# Patient Record
Sex: MALE | Race: WHITE | Employment: OTHER | URBAN - METROPOLITAN AREA
[De-identification: names, ages, dates, MRNs, and addresses within clinical notes are randomized per-mention and may not be internally consistent; named-entity substitution may affect disease eponyms.]

---

## 2017-11-29 ENCOUNTER — OFFICE VISIT (OUTPATIENT)
Dept: CARDIOLOGY CLINIC | Age: 72
End: 2017-11-29

## 2017-11-29 VITALS
HEIGHT: 70 IN | SYSTOLIC BLOOD PRESSURE: 120 MMHG | DIASTOLIC BLOOD PRESSURE: 58 MMHG | WEIGHT: 204 LBS | BODY MASS INDEX: 29.2 KG/M2 | HEART RATE: 62 BPM

## 2017-11-29 DIAGNOSIS — I10 ESSENTIAL HYPERTENSION: Primary | ICD-10-CM

## 2017-11-29 DIAGNOSIS — I25.10 CORONARY ARTERY DISEASE INVOLVING NATIVE HEART WITHOUT ANGINA PECTORIS, UNSPECIFIED VESSEL OR LESION TYPE: ICD-10-CM

## 2017-11-29 DIAGNOSIS — E78.00 PURE HYPERCHOLESTEROLEMIA: ICD-10-CM

## 2017-11-29 PROCEDURE — 99214 OFFICE O/P EST MOD 30 MIN: CPT | Performed by: NURSE PRACTITIONER

## 2017-11-29 RX ORDER — SUCRALFATE 1 G/1
1 TABLET ORAL 4 TIMES DAILY
COMMUNITY
End: 2018-05-09

## 2017-11-29 RX ORDER — ATORVASTATIN CALCIUM 40 MG/1
TABLET, FILM COATED ORAL
Qty: 90 TABLET | Refills: 3 | Status: SHIPPED | OUTPATIENT
Start: 2017-11-29 | End: 2018-11-14 | Stop reason: SDUPTHER

## 2017-11-29 RX ORDER — METOPROLOL SUCCINATE 50 MG/1
TABLET, EXTENDED RELEASE ORAL
Qty: 90 TABLET | Refills: 3 | Status: SHIPPED | OUTPATIENT
Start: 2017-11-29 | End: 2018-11-14 | Stop reason: SDUPTHER

## 2017-11-29 NOTE — COMMUNICATION BODY
[facility]     Outpatient Follow Up Note    Subjective:   CHIEF COMPLAINT / HPI:  Follow Up secondary to hyperlipidemia and coronary artery disease, and HTN       William Avalos is 81 y.o. male who presents today for a routine follow up. Currently the patient denies significant chest pain. There is no associated SMITH. The patient is not experiencing palpitations or dizziness. With regard to medication therapy the patient has been compliant with prescribed regimen He has been have intermittently stomach pain for at least one year and ECHO (2014)showed gallstones. He had his GB removed then had colonoscopy and EGD which a polyp which was cancerous and then had surgery. He did not need any chemo or radiation      Cardiac angiogram 2008-hx of NonQwave MI, s/p PCI of LAD, with nonobstructive disease in RCA and cfx,  NL EF. Past Medical History:    Past Medical History:   Diagnosis Date    Atrial fibrillation (HCC)     DOES NOT HAVE HYPERTENSION, PER PT     Social History:    History   Smoking Status    Current Every Day Smoker    Packs/day: 0.50    Years: 45.00    Types: Cigarettes   Smokeless Tobacco    Never Used     Current Medications:  Current Outpatient Prescriptions on File Prior to Visit   Medication Sig Dispense Refill    calcium carbonate (TUMS) 500 MG chewable tablet Take 1 tablet by mouth daily      aspirin EC 81 MG EC tablet Take 1 tablet by mouth daily. 30 tablet 11     No current facility-administered medications on file prior to visit. REVIEW OF SYSTEMS:    CONSTITUTIONAL: No major weight gain or loss, fatigue, weakness, night sweats or fever. HEENT: No new vision difficulties or ringing in the ears. RESPIRATORY: No new SOB, PND, orthopnea or cough. CARDIOVASCULAR: See HPI  GI: No nausea, vomiting, diarrhea, constipation, abdominal pain or changes in bowel habits. : No urinary frequency, urgency, incontinence hematuria or dysuria.   SKIN: No cyanosis or skin lesions. MUSCULOSKELETAL: No new muscle or joint pain. NEUROLOGICAL: No syncope or TIA-like symptoms. PSYCHIATRIC: No anxiety, pain, insomnia or depression    Objective:   PHYSICAL EXAM:        VITALS:    Vitals:    11/29/17 1507   BP: (!) 120/58   Pulse: 62       CONSTITUTIONAL: Cooperative, no apparent distress, and appears well nourished / developed  NEUROLOGIC:  Awake and orientated to person, place and time. PSYCH: Calm affect. SKIN: Warm and dry. HEENT: Sclera non-icteric, normocephalic, neck supple, no elevation of JVP, normal carotid pulses with no bruits and thyroid normal size. LUNGS:  No increased work of breathing and clear to auscultation, no crackles or wheezing  CARDIOVASCULAR:  Regular rate and rhythm with no murmurs, gallops, rubs, or abnormal heart sounds, normal PMI. The apical impulses not displaced  JVP less than 8 cm H2O  Heart tones are crisp and normal  Cervical veins are not engorged  The carotid upstroke is normal in amplitude and contour without delay or bruit  JVP is not elevated  ABDOMEN:  Normal bowel sounds, non-distended and non-tender to palpation  EXT: No edema, no calf tenderness. Pulses are present bilaterally. Last Angiogram: 2008, hx of NonQwave MI, s/p PCI of LAD, with nonobstructive disease in RCA and cfx,  NL EF. Last ECHO: 6/2014  -Normal left ventricle size and systolic function with an estimated   ejection  fraction of 55%. -Borderline concentric left ventricular hypertrophy is present.  -No regional wall motion abnormalities are seen.  -Normal diastolic filling pattern for age. -There is mild mitral and trivial tricuspid regurgitation with RVSP  estimated at 26 mmHg. -Incidentally multiple gallstones visualized. DATA:    Assessment:     1. Hyperlipidemia   Needs drawn 8/14  Cholesterol, Total 195  Final   Triglycerides 201  HDL 36        Lipitor 40mg daily  Order Lipid panel      2.   CAD-stable no need for testing, he does not have any chest

## 2017-11-29 NOTE — PROGRESS NOTES
lesions. MUSCULOSKELETAL: No new muscle or joint pain. NEUROLOGICAL: No syncope or TIA-like symptoms. PSYCHIATRIC: No anxiety, pain, insomnia or depression    Objective:   PHYSICAL EXAM:        VITALS:    Vitals:    11/29/17 1507   BP: (!) 120/58   Pulse: 62       CONSTITUTIONAL: Cooperative, no apparent distress, and appears well nourished / developed  NEUROLOGIC:  Awake and orientated to person, place and time. PSYCH: Calm affect. SKIN: Warm and dry. HEENT: Sclera non-icteric, normocephalic, neck supple, no elevation of JVP, normal carotid pulses with no bruits and thyroid normal size. LUNGS:  No increased work of breathing and clear to auscultation, no crackles or wheezing  CARDIOVASCULAR:  Regular rate and rhythm with no murmurs, gallops, rubs, or abnormal heart sounds, normal PMI. The apical impulses not displaced  JVP less than 8 cm H2O  Heart tones are crisp and normal  Cervical veins are not engorged  The carotid upstroke is normal in amplitude and contour without delay or bruit  JVP is not elevated  ABDOMEN:  Normal bowel sounds, non-distended and non-tender to palpation  EXT: No edema, no calf tenderness. Pulses are present bilaterally. Last Angiogram: 2008, hx of NonQwave MI, s/p PCI of LAD, with nonobstructive disease in RCA and cfx,  NL EF. Last ECHO: 6/2014  -Normal left ventricle size and systolic function with an estimated   ejection  fraction of 55%. -Borderline concentric left ventricular hypertrophy is present.  -No regional wall motion abnormalities are seen.  -Normal diastolic filling pattern for age. -There is mild mitral and trivial tricuspid regurgitation with RVSP  estimated at 26 mmHg. -Incidentally multiple gallstones visualized. DATA:    Assessment:     1. Hyperlipidemia   Needs drawn 8/14  Cholesterol, Total 195  Final   Triglycerides 201  HDL 36        Lipitor 40mg daily  Order Lipid panel      2.   CAD-stable no need for testing, he does not have any chest pain/SOB  3. HTN-   Plan:   I had the opportunity to review the clinical symptoms and presentation of Lisa Mcginnis. The patient is currently smoking. The risks related to smoking were reviewed with the patient. Recommend maintaining a smoke-free lifestyle. Products available for smoking cessation were discussed in detail. Discussed exercise, patient golf's 3 times a week at [facility]. Low saturated fat diet. Thank you for allowing to us to participate in the care of Lisa Mcginnis.

## 2017-11-29 NOTE — LETTER
[address] Cardiology - [facility]  [address] [place]. [place] [address]  Phone: [phone]  Fax: [phone]    Adam Hand NP        November 29, 2017     Fiorella Bertrand MD  [address]    Patient: Elvi Blake  MR Number: [id number]  YOB: 1945  Date of Visit: 11/29/2017    Dear Dr. Fiorella Bertrand:            Lilliana Gooden     Outpatient Follow Up Note    Subjective:   [facility]:  Follow Up secondary to hyperlipidemia and coronary artery disease, and HTN       Elvi Blake is 81 y.o. male who presents today for a routine follow up. Currently the patient denies significant chest pain. There is no associated SMITH. The patient is not experiencing palpitations or dizziness. With regard to medication therapy the patient has been compliant with prescribed regimen He has been have intermittently stomach pain for at least one year and ECHO (2014)showed gallstones. He had his GB removed then had colonoscopy and EGD which a polyp which was cancerous and then had surgery. He did not need any chemo or radiation      Cardiac angiogram 2008-hx of NonQwave MI, s/p PCI of LAD, with nonobstructive disease in RCA and cfx,  NL EF. Past Medical History:    Past Medical History:   Diagnosis Date    Atrial fibrillation (HCC)     DOES NOT HAVE HYPERTENSION, PER PT     Social History:    History   Smoking Status    Current Every Day Smoker    Packs/day: 0.50    Years: 45.00    Types: Cigarettes   Smokeless Tobacco    Never Used     Current Medications:  Current Outpatient Prescriptions on File Prior to Visit   Medication Sig Dispense Refill    calcium carbonate (TUMS) 500 MG chewable tablet Take 1 tablet by mouth daily      aspirin EC 81 MG EC tablet Take 1 tablet by mouth daily. 30 tablet 11     No current facility-administered medications on file prior to visit.       REVIEW OF SYSTEMS: -Normal diastolic filling pattern for age. -There is mild mitral and trivial tricuspid regurgitation with RVSP  estimated at 26 mmHg. -Incidentally multiple gallstones visualized. DATA:    Assessment:     1. Hyperlipidemia   Needs drawn 8/14  Cholesterol, Total 195  Final   Triglycerides 201  HDL 36        Lipitor 40mg daily  Order Lipid panel      2. CAD-stable no need for testing, he does not have any chest pain/SOB  3. HTN-   Plan:   I had the opportunity to review the clinical symptoms and presentation of Meredith Miner. The patient is currently smoking. The risks related to smoking were reviewed with the patient. Recommend maintaining a smoke-free lifestyle. Products available for smoking cessation were discussed in detail. Discussed exercise, patient golf's 3 times a week at [employer]. Low saturated fat diet. Thank you for allowing to us to participate in the care of Meredith Miner. If you have questions, please do not hesitate to call me. I look forward to following Jose Ochoa along with you.     Sincerely,        Soraida Osorio NP

## 2018-05-09 ENCOUNTER — OFFICE VISIT (OUTPATIENT)
Dept: CARDIOLOGY CLINIC | Age: 73
End: 2018-05-09

## 2018-05-09 VITALS
WEIGHT: 209 LBS | HEART RATE: 66 BPM | SYSTOLIC BLOOD PRESSURE: 134 MMHG | HEIGHT: 70 IN | BODY MASS INDEX: 29.92 KG/M2 | DIASTOLIC BLOOD PRESSURE: 70 MMHG

## 2018-05-09 DIAGNOSIS — I25.10 CORONARY ARTERY DISEASE INVOLVING NATIVE HEART WITHOUT ANGINA PECTORIS, UNSPECIFIED VESSEL OR LESION TYPE: ICD-10-CM

## 2018-05-09 DIAGNOSIS — I10 ESSENTIAL HYPERTENSION: ICD-10-CM

## 2018-05-09 DIAGNOSIS — E78.00 PURE HYPERCHOLESTEROLEMIA: Primary | ICD-10-CM

## 2018-05-09 PROCEDURE — 99214 OFFICE O/P EST MOD 30 MIN: CPT | Performed by: NURSE PRACTITIONER

## 2018-05-09 PROCEDURE — 93000 ELECTROCARDIOGRAM COMPLETE: CPT | Performed by: NURSE PRACTITIONER

## 2018-05-17 ENCOUNTER — PROCEDURE VISIT (OUTPATIENT)
Dept: CARDIOLOGY CLINIC | Age: 73
End: 2018-05-17

## 2018-05-17 DIAGNOSIS — I25.10 CORONARY ARTERY DISEASE INVOLVING NATIVE HEART WITHOUT ANGINA PECTORIS, UNSPECIFIED VESSEL OR LESION TYPE: ICD-10-CM

## 2018-05-17 DIAGNOSIS — I10 ESSENTIAL HYPERTENSION: ICD-10-CM

## 2018-05-17 DIAGNOSIS — R00.2 PALPITATIONS: Primary | ICD-10-CM

## 2018-05-17 PROCEDURE — 93325 DOPPLER ECHO COLOR FLOW MAPG: CPT | Performed by: INTERNAL MEDICINE

## 2018-05-17 PROCEDURE — 93320 DOPPLER ECHO COMPLETE: CPT | Performed by: INTERNAL MEDICINE

## 2018-05-17 PROCEDURE — 93351 STRESS TTE COMPLETE: CPT | Performed by: INTERNAL MEDICINE

## 2018-06-27 ENCOUNTER — TELEPHONE (OUTPATIENT)
Dept: CARDIOLOGY CLINIC | Age: 73
End: 2018-06-27

## 2018-08-01 PROBLEM — R00.2 HEART PALPITATIONS: Status: ACTIVE | Noted: 2018-08-01

## 2018-08-02 ENCOUNTER — OFFICE VISIT (OUTPATIENT)
Dept: CARDIOLOGY CLINIC | Age: 73
End: 2018-08-02

## 2018-08-02 VITALS
WEIGHT: 205.8 LBS | DIASTOLIC BLOOD PRESSURE: 81 MMHG | SYSTOLIC BLOOD PRESSURE: 110 MMHG | HEART RATE: 90 BPM | BODY MASS INDEX: 29.46 KG/M2 | HEIGHT: 70 IN

## 2018-08-02 DIAGNOSIS — R00.2 HEART PALPITATIONS: ICD-10-CM

## 2018-08-02 DIAGNOSIS — I25.10 CORONARY ARTERY DISEASE INVOLVING NATIVE CORONARY ARTERY OF NATIVE HEART WITHOUT ANGINA PECTORIS: ICD-10-CM

## 2018-08-02 DIAGNOSIS — I48.0 PAF (PAROXYSMAL ATRIAL FIBRILLATION) (HCC): Primary | ICD-10-CM

## 2018-08-02 DIAGNOSIS — I10 BENIGN ESSENTIAL HTN: ICD-10-CM

## 2018-08-02 DIAGNOSIS — E78.5 HYPERLIPIDEMIA, UNSPECIFIED HYPERLIPIDEMIA TYPE: ICD-10-CM

## 2018-08-02 PROCEDURE — 93000 ELECTROCARDIOGRAM COMPLETE: CPT | Performed by: INTERNAL MEDICINE

## 2018-08-02 PROCEDURE — 99244 OFF/OP CNSLTJ NEW/EST MOD 40: CPT | Performed by: INTERNAL MEDICINE

## 2018-08-02 NOTE — LETTER
Susan Pichardo  EP Procedure Sheet      Darlin Lin  1945    EP Procedures     Pacemaker implant  EP Study    ICD implant  Atrial flutter ablation     Biv implant  Atrial fibrillation ablation    Generator Change  SVT ablation    Lead revision  VT ablation    Lead extraction +/- upgrade  AVN ablation    Loop implant X Cardioversion     Other:   CAPRI     Equipment     Medtronic   CHENG Mapping System    [place]  [address] [address]  CryoAblation    Biotronik  Laser Lead Extraction    Special Equipment       EP Procedures Scheduling Request    Time Requested     Specific Day    Anesthesia    CT surgery backup    Location      Pre-Procedure Labs / Imaging     PT/INR  Type & cross    CBC  Units PRBC    BMP/Mg  Units FFP    Venogram  CXR    Echo  Cardiac CTA for Pulmonary vein mapping     Patient Instructions    Continue eliquis  Remain NPO after midnight tonight  Cardioversion tomorrow  Call tomorrow at 11 am if would like to cancel cardioversion ( if comes out of atrial  fibrillation)

## 2018-08-02 NOTE — PROGRESS NOTES
[facility]   Electrophysiology consultation  Date: 8/2/2018  I had the privilege of visiting Merced Jose in the office on referral from Dr. José Argueta    CC:   afib    HPI    Merced Jose is 72 y.o. male who presents today for a follow up since diagnosed with AF and starting eliquis.                Cardiac angiogram 2008-hx of NonQwave MI, s/p PCI of LAD, with nonobstructive disease in RCA and cfx,  NL EF. He had an episode of Afib in 2001. He has also been treated for rectal cancer. Yesterday at 4 pm, 6 holes into golfing, he developed a headache and overall not feeling well. He still feels poorly now, is in afib. Event monitor showed afib(13 percent) aflutter, stress echo nl. He was started on eliquis 6/27/18. He has not had any chest pain, sob. He has had palpitations for several years and just getting worse            Past Medical History:   Diagnosis Date    Atrial fibrillation ([address]     DOES NOT HAVE HYPERTENSION, PER PT        Past Surgical History:   Procedure Laterality Date    CHOLECYSTECTOMY, LAPAROSCOPIC  1/13/16    LAPAROSCOPIC CHOLECYSTECTOMY WITH CHOLANGIOGRAM    COLECTOMY  [facility]/08/2017    [facility]    CORONARY ANGIOPLASTY WITH STENT PLACEMENT  2008    TONSILLECTOMY  1955    TOOTH EXTRACTION  2013    wisdom       Allergies:  No Known Allergies    Social History:  Reviewed. reports that he has been smoking Cigarettes. He has a 22.50 pack-year smoking history. He has never used smokeless tobacco. He reports that he drinks about 6.0 oz of alcohol per week . He reports that he does not use drugs. Family History:  Reviewed. family history includes Heart Disease in his mother. Review of System:  All other systems reviewed except for that noted above.  Pertinent negatives and positives are:     · General: negative for fever, chills   · Ophthalmic ROS: negative for - eye pain or loss of vision  · ENT ROS: negative for - headaches, sore throat · Respiratory: negative for - cough, sputum  · Cardiovascular: Reviewed in HPI  · Gastrointestinal: negative for - abdominal pain, diarrhea, N/V  · Hematology: negative for - bleeding, blood clots, bruising or jaundice  · Genito-Urinary:  negative for - Dysuria or incontinence  · Musculoskeletal: negative for - Joint swelling, muscle pain  · Neurological: negative for - confusion, dizziness, headaches   · Psychiatric: No anxiety, no depression. · Dermatological: negative for - rash    Physical Examination:  Vitals:    08/02/18 1417   BP: 110/81   Pulse: 90        · Constitutional: Oriented. No distress. · Head: Normocephalic and atraumatic. · Mouth/Throat: Oropharynx is clear and moist.   · Eyes: Conjunctivae normal. EOM are normal.   · Neck: Neck supple. No rigidity. No JVD present. · Cardiovascular: Normal rate, irregular rhythm, S1&S2. · Pulmonary/Chest: Bilateral respiratory sounds. No wheezes, No rhonchi. · Abdominal: Soft. Bowel sounds present. No distension, No tenderness. · Musculoskeletal: No tenderness. No edema    · Lymphadenopathy: Has no cervical adenopathy. · Neurological: Alert and oriented. Cranial nerve appears intact, No Gross deficit   · Skin: Skin is warm and dry. No rash noted. · Psychiatric: Has a normal behavior     Labs, diagnostic and imaging results reviewed.      ECG: atrial fibrillation 81  Echo: 5/18 normal stress echo  Cath: 2008-hx of NonQwave MI, s/p PCI of LAD, with nonobstructive disease in RCA and cfx,  NL EF    Medication:  Current Outpatient Prescriptions   Medication Sig Dispense Refill    apixaban (ELIQUIS) 5 MG TABS tablet Take 1 tablet by mouth 2 times daily 60 tablet 5    esomeprazole (NEXIUM) 20 MG delayed release capsule Take 20 mg by mouth every morning (before breakfast)      RaNITidine HCl (ZANTAC 150 MAXIMUM STRENGTH PO) Take by mouth      metoprolol succinate (TOPROL XL) 50 MG extended release tablet TAKE ONE TABLET BY MOUTH DAILY 90 tablet 3  atorvastatin (LIPITOR) 40 MG tablet TAKE 1 TABLET BY MOUTH DAILY 90 tablet 3    calcium carbonate (TUMS) 500 MG chewable tablet Take 1 tablet by mouth daily      aspirin EC 81 MG EC tablet Take 1 tablet by mouth daily. 30 tablet 11     No current facility-administered medications for this visit. Assessment and plan:   CAD--2008-hx of NonQwave MI, s/p PCI of LAD, with nonobstructive disease in RCA and cfx,  NL EF  5/18 stress echo nl    Atrial fibrillation -symptomatic, on eliquis since 6/27/18 6/18 Creat 1.1  Discussed ablation, cardioversion, medical management with antiarrhythmic drugs , rate vs rhythm control  Given his symptoms a rhythm startegy is better suited  Afib risk factors including age, HTN, obesity, inactivity and MEIR were discussed with patient. Risk factor modification recommended. All questions were answered. For the time being, will do cardioversion if he does not go back to sinus  May consider tikosyn or amiodarone vs ablation      htn--/81 (Site: Left Arm, Position: Sitting, Cuff Size: Large Adult)   Pulse 90   Ht 5' 10\" (1.778 m)   Wt 205 lb 12.8 oz (93.4 kg)   BMI 29.53 kg/m²       Hchol--11/16/15  tri 241 ldl 94 hdl 37   On lipitor    Plan  Continue eliquis  Cardioversion tomorrow if still in afib  Recommended to remain npo, but take eliquis  Will consider ablation, information given  Instructed to call if has decided on medical management vs ablation  Follow up in 3 months       Thank you for allowing me to participate in the care of Fawad Parada. Further evaluation will be based upon the patient's clinical course and testing results. All questions and concerns were addressed to the patient/family. Alternatives to my treatment were discussed. I have discussed the above stated plan and the patient verbalized understanding and agreed with the plan. NOTE: This report was transcribed using voice recognition software.  Every effort was made to

## 2018-08-08 ENCOUNTER — TELEPHONE (OUTPATIENT)
Dept: CARDIOLOGY CLINIC | Age: 73
End: 2018-08-08

## 2018-08-21 ENCOUNTER — TELEPHONE (OUTPATIENT)
Dept: CARDIOLOGY CLINIC | Age: 73
End: 2018-08-21

## 2018-08-21 NOTE — TELEPHONE ENCOUNTER
RN spoke with Myriam Elena and advised her that he would likely benefit more from having the colonoscopy first as he will not be able to hold anticoagulation post ablation. Myriam Elena will be faxing a form of risk assessment.

## 2018-08-21 NOTE — TELEPHONE ENCOUNTER
Doctors office calling to see if [facility] wants this patient to wait until after his ablation to have his colonoscopy ?  Patient has not scheduled ablation yet because he wasn't sure which procedure he should have done first .

## 2018-09-11 ENCOUNTER — TELEPHONE (OUTPATIENT)
Dept: CARDIOLOGY CLINIC | Age: 73
End: 2018-09-11

## 2018-09-11 NOTE — TELEPHONE ENCOUNTER
Anette Aguirre, states [facility] wants a letter sent to Dr Diane Juarez stating Eliquis can be stopped after Sat 09/15/18 dose for his colonoscopy on 09/17/18.  Pls call to advise Thank you

## 2018-10-18 ENCOUNTER — ANESTHESIA EVENT (OUTPATIENT)
Dept: CARDIAC CATH/INVASIVE PROCEDURES | Age: 73
End: 2018-10-18
Payer: MEDICARE

## 2018-10-19 ENCOUNTER — ANESTHESIA (OUTPATIENT)
Dept: CARDIAC CATH/INVASIVE PROCEDURES | Age: 73
End: 2018-10-19
Payer: MEDICARE

## 2018-10-19 ENCOUNTER — HOSPITAL ENCOUNTER (OUTPATIENT)
Dept: CARDIAC CATH/INVASIVE PROCEDURES | Age: 73
Setting detail: OBSERVATION
Discharge: HOME | End: 2018-10-20
Attending: INTERNAL MEDICINE | Admitting: INTERNAL MEDICINE
Payer: MEDICARE

## 2018-10-19 VITALS
SYSTOLIC BLOOD PRESSURE: 124 MMHG | TEMPERATURE: 98.1 F | DIASTOLIC BLOOD PRESSURE: 68 MMHG | RESPIRATION RATE: 3 BRPM | OXYGEN SATURATION: 99 %

## 2018-10-19 PROBLEM — I48.0 PAF (PAROXYSMAL ATRIAL FIBRILLATION) (HCC): Status: ACTIVE | Noted: 2018-10-19

## 2018-10-19 LAB
A/G RATIO: 1.4 (ref 1.1–2.2)
ABO/RH: NORMAL
ALBUMIN SERPL-MCNC: 4.2 G/DL (ref 3.4–5)
ALP BLD-CCNC: 91 U/L (ref 40–129)
ALT SERPL-CCNC: 16 U/L (ref 10–40)
ANION GAP SERPL CALCULATED.3IONS-SCNC: 9 MMOL/L (ref 3–16)
ANTIBODY SCREEN: NORMAL
AST SERPL-CCNC: 14 U/L (ref 15–37)
BILIRUB SERPL-MCNC: 0.4 MG/DL (ref 0–1)
BUN BLDV-MCNC: 19 MG/DL (ref 7–20)
CALCIUM SERPL-MCNC: 10.2 MG/DL (ref 8.3–10.6)
CHLORIDE BLD-SCNC: 104 MMOL/L (ref 99–110)
CO2: 25 MMOL/L (ref 21–32)
CREAT SERPL-MCNC: 1 MG/DL (ref 0.8–1.3)
EKG ATRIAL RATE: 51 BPM
EKG DIAGNOSIS: NORMAL
EKG P AXIS: 38 DEGREES
EKG P-R INTERVAL: 172 MS
EKG Q-T INTERVAL: 412 MS
EKG QRS DURATION: 84 MS
EKG QTC CALCULATION (BAZETT): 379 MS
EKG R AXIS: 17 DEGREES
EKG T AXIS: 42 DEGREES
EKG VENTRICULAR RATE: 51 BPM
GFR AFRICAN AMERICAN: >60
GFR NON-AFRICAN AMERICAN: >60
GLOBULIN: 3.1 G/DL
GLUCOSE BLD-MCNC: 107 MG/DL (ref 70–99)
HCT VFR BLD CALC: 42.5 % (ref 40.5–52.5)
HEMOGLOBIN: 14.6 G/DL (ref 13.5–17.5)
INR BLD: 1.26 (ref 0.86–1.14)
MAGNESIUM: 2.1 MG/DL (ref 1.8–2.4)
MCH RBC QN AUTO: 30.6 PG (ref 26–34)
MCHC RBC AUTO-ENTMCNC: 34.3 G/DL (ref 31–36)
MCV RBC AUTO: 89.3 FL (ref 80–100)
PDW BLD-RTO: 14.9 % (ref 12.4–15.4)
PLATELET # BLD: 203 K/UL (ref 135–450)
PMV BLD AUTO: 8.4 FL (ref 5–10.5)
POC ACT LR: 327 SEC
POC ACT LR: 372 SEC
POC ACT LR: >400 SEC
POC ACT LR: >400 SEC
POTASSIUM SERPL-SCNC: 4.3 MMOL/L (ref 3.5–5.1)
PROTHROMBIN TIME: 14.4 SEC (ref 9.8–13)
RBC # BLD: 4.77 M/UL (ref 4.2–5.9)
SODIUM BLD-SCNC: 138 MMOL/L (ref 136–145)
TOTAL PROTEIN: 7.3 G/DL (ref 6.4–8.2)
WBC # BLD: 9.2 K/UL (ref 4–11)

## 2018-10-19 PROCEDURE — 93320 DOPPLER ECHO COMPLETE: CPT | Performed by: INTERNAL MEDICINE

## 2018-10-19 PROCEDURE — 6360000002 HC RX W HCPCS: Performed by: INTERNAL MEDICINE

## 2018-10-19 PROCEDURE — 85347 COAGULATION TIME ACTIVATED: CPT

## 2018-10-19 PROCEDURE — 93613 INTRACARDIAC EPHYS 3D MAPG: CPT | Performed by: INTERNAL MEDICINE

## 2018-10-19 PROCEDURE — 2500000003 HC RX 250 WO HCPCS

## 2018-10-19 PROCEDURE — 93656 COMPRE EP EVAL ABLTJ ATR FIB: CPT | Performed by: INTERNAL MEDICINE

## 2018-10-19 PROCEDURE — 85027 COMPLETE CBC AUTOMATED: CPT

## 2018-10-19 PROCEDURE — 85610 PROTHROMBIN TIME: CPT

## 2018-10-19 PROCEDURE — 93655 ICAR CATH ABLTJ DSCRT ARRHYT: CPT | Performed by: INTERNAL MEDICINE

## 2018-10-19 PROCEDURE — 2580000003 HC RX 258

## 2018-10-19 PROCEDURE — 93662 INTRACARDIAC ECG (ICE): CPT | Performed by: INTERNAL MEDICINE

## 2018-10-19 PROCEDURE — C1730 CATH, EP, 19 OR FEW ELECT: HCPCS

## 2018-10-19 PROCEDURE — 6360000002 HC RX W HCPCS

## 2018-10-19 PROCEDURE — 6360000002 HC RX W HCPCS: Performed by: NURSE ANESTHETIST, CERTIFIED REGISTERED

## 2018-10-19 PROCEDURE — 86850 RBC ANTIBODY SCREEN: CPT

## 2018-10-19 PROCEDURE — 2580000003 HC RX 258: Performed by: INTERNAL MEDICINE

## 2018-10-19 PROCEDURE — 83735 ASSAY OF MAGNESIUM: CPT

## 2018-10-19 PROCEDURE — 2580000003 HC RX 258: Performed by: NURSE ANESTHETIST, CERTIFIED REGISTERED

## 2018-10-19 PROCEDURE — G0378 HOSPITAL OBSERVATION PER HR: HCPCS

## 2018-10-19 PROCEDURE — 3700000001 HC ADD 15 MINUTES (ANESTHESIA)

## 2018-10-19 PROCEDURE — C1732 CATH, EP, DIAG/ABL, 3D/VECT: HCPCS

## 2018-10-19 PROCEDURE — 6370000000 HC RX 637 (ALT 250 FOR IP): Performed by: INTERNAL MEDICINE

## 2018-10-19 PROCEDURE — 80053 COMPREHEN METABOLIC PANEL: CPT

## 2018-10-19 PROCEDURE — 93005 ELECTROCARDIOGRAM TRACING: CPT | Performed by: INTERNAL MEDICINE

## 2018-10-19 PROCEDURE — 2720000010 HC SURG SUPPLY STERILE

## 2018-10-19 PROCEDURE — 86900 BLOOD TYPING SEROLOGIC ABO: CPT

## 2018-10-19 PROCEDURE — C1894 INTRO/SHEATH, NON-LASER: HCPCS

## 2018-10-19 PROCEDURE — 93312 ECHO TRANSESOPHAGEAL: CPT | Performed by: INTERNAL MEDICINE

## 2018-10-19 PROCEDURE — 93325 DOPPLER ECHO COLOR FLOW MAPG: CPT | Performed by: INTERNAL MEDICINE

## 2018-10-19 PROCEDURE — 36415 COLL VENOUS BLD VENIPUNCTURE: CPT

## 2018-10-19 PROCEDURE — 93623 PRGRMD STIMJ&PACG IV RX NFS: CPT | Performed by: INTERNAL MEDICINE

## 2018-10-19 PROCEDURE — 2500000003 HC RX 250 WO HCPCS: Performed by: NURSE ANESTHETIST, CERTIFIED REGISTERED

## 2018-10-19 PROCEDURE — 86901 BLOOD TYPING SEROLOGIC RH(D): CPT

## 2018-10-19 PROCEDURE — C1759 CATH, INTRA ECHOCARDIOGRAPHY: HCPCS

## 2018-10-19 PROCEDURE — 3700000000 HC ANESTHESIA ATTENDED CARE

## 2018-10-19 PROCEDURE — C1769 GUIDE WIRE: HCPCS

## 2018-10-19 RX ORDER — EPHEDRINE SULFATE 50 MG/ML
INJECTION INTRAVENOUS PRN
Status: DISCONTINUED | OUTPATIENT
Start: 2018-10-19 | End: 2018-10-19 | Stop reason: SDUPTHER

## 2018-10-19 RX ORDER — FENTANYL CITRATE 50 UG/ML
50 INJECTION, SOLUTION INTRAMUSCULAR; INTRAVENOUS EVERY 5 MIN PRN
Status: DISCONTINUED | OUTPATIENT
Start: 2018-10-19 | End: 2018-10-19

## 2018-10-19 RX ORDER — NEOSTIGMINE METHYLSULFATE 5 MG/5 ML
SYRINGE (ML) INTRAVENOUS PRN
Status: DISCONTINUED | OUTPATIENT
Start: 2018-10-19 | End: 2018-10-19 | Stop reason: SDUPTHER

## 2018-10-19 RX ORDER — PROMETHAZINE HYDROCHLORIDE 25 MG/ML
6.25 INJECTION, SOLUTION INTRAMUSCULAR; INTRAVENOUS PRN
Status: DISCONTINUED | OUTPATIENT
Start: 2018-10-19 | End: 2018-10-19

## 2018-10-19 RX ORDER — FENTANYL CITRATE 50 UG/ML
INJECTION, SOLUTION INTRAMUSCULAR; INTRAVENOUS PRN
Status: DISCONTINUED | OUTPATIENT
Start: 2018-10-19 | End: 2018-10-19 | Stop reason: SDUPTHER

## 2018-10-19 RX ORDER — ONDANSETRON 2 MG/ML
INJECTION INTRAMUSCULAR; INTRAVENOUS PRN
Status: DISCONTINUED | OUTPATIENT
Start: 2018-10-19 | End: 2018-10-19 | Stop reason: SDUPTHER

## 2018-10-19 RX ORDER — HEPARIN SODIUM 1000 [USP'U]/ML
INJECTION, SOLUTION INTRAVENOUS; SUBCUTANEOUS PRN
Status: DISCONTINUED | OUTPATIENT
Start: 2018-10-19 | End: 2018-10-19 | Stop reason: SDUPTHER

## 2018-10-19 RX ORDER — OXYCODONE HYDROCHLORIDE 5 MG/1
5 TABLET ORAL PRN
Status: DISCONTINUED | OUTPATIENT
Start: 2018-10-19 | End: 2018-10-19

## 2018-10-19 RX ORDER — HYDROMORPHONE HCL 110MG/55ML
0.5 PATIENT CONTROLLED ANALGESIA SYRINGE INTRAVENOUS EVERY 5 MIN PRN
Status: DISCONTINUED | OUTPATIENT
Start: 2018-10-19 | End: 2018-10-19

## 2018-10-19 RX ORDER — GLYCOPYRROLATE 1 MG/5 ML
SYRINGE (ML) INTRAVENOUS PRN
Status: DISCONTINUED | OUTPATIENT
Start: 2018-10-19 | End: 2018-10-19 | Stop reason: SDUPTHER

## 2018-10-19 RX ORDER — SODIUM CHLORIDE 0.9 % (FLUSH) 0.9 %
10 SYRINGE (ML) INJECTION PRN
Status: DISCONTINUED | OUTPATIENT
Start: 2018-10-19 | End: 2018-10-20 | Stop reason: HOSPADM

## 2018-10-19 RX ORDER — HYDROMORPHONE HCL 110MG/55ML
0.25 PATIENT CONTROLLED ANALGESIA SYRINGE INTRAVENOUS EVERY 5 MIN PRN
Status: DISCONTINUED | OUTPATIENT
Start: 2018-10-19 | End: 2018-10-19

## 2018-10-19 RX ORDER — ATORVASTATIN CALCIUM 40 MG/1
40 TABLET, FILM COATED ORAL DAILY
Status: DISCONTINUED | OUTPATIENT
Start: 2018-10-20 | End: 2018-10-20 | Stop reason: HOSPADM

## 2018-10-19 RX ORDER — DIPHENHYDRAMINE HYDROCHLORIDE 50 MG/ML
12.5 INJECTION INTRAMUSCULAR; INTRAVENOUS
Status: DISCONTINUED | OUTPATIENT
Start: 2018-10-19 | End: 2018-10-19

## 2018-10-19 RX ORDER — SODIUM CHLORIDE 9 MG/ML
INJECTION, SOLUTION INTRAVENOUS CONTINUOUS
Status: DISCONTINUED | OUTPATIENT
Start: 2018-10-19 | End: 2018-10-20 | Stop reason: HOSPADM

## 2018-10-19 RX ORDER — SODIUM CHLORIDE 0.9 % (FLUSH) 0.9 %
10 SYRINGE (ML) INJECTION EVERY 12 HOURS SCHEDULED
Status: DISCONTINUED | OUTPATIENT
Start: 2018-10-19 | End: 2018-10-20 | Stop reason: HOSPADM

## 2018-10-19 RX ORDER — LABETALOL HYDROCHLORIDE 5 MG/ML
5 INJECTION, SOLUTION INTRAVENOUS EVERY 10 MIN PRN
Status: DISCONTINUED | OUTPATIENT
Start: 2018-10-19 | End: 2018-10-19

## 2018-10-19 RX ORDER — ASPIRIN 81 MG/1
81 TABLET ORAL DAILY
Status: DISCONTINUED | OUTPATIENT
Start: 2018-10-19 | End: 2018-10-20 | Stop reason: HOSPADM

## 2018-10-19 RX ORDER — DEXAMETHASONE SODIUM PHOSPHATE 4 MG/ML
INJECTION, SOLUTION INTRA-ARTICULAR; INTRALESIONAL; INTRAMUSCULAR; INTRAVENOUS; SOFT TISSUE PRN
Status: DISCONTINUED | OUTPATIENT
Start: 2018-10-19 | End: 2018-10-19 | Stop reason: SDUPTHER

## 2018-10-19 RX ORDER — VECURONIUM BROMIDE 1 MG/ML
INJECTION, POWDER, LYOPHILIZED, FOR SOLUTION INTRAVENOUS PRN
Status: DISCONTINUED | OUTPATIENT
Start: 2018-10-19 | End: 2018-10-19 | Stop reason: SDUPTHER

## 2018-10-19 RX ORDER — SODIUM CHLORIDE 9 MG/ML
INJECTION, SOLUTION INTRAVENOUS CONTINUOUS PRN
Status: DISCONTINUED | OUTPATIENT
Start: 2018-10-19 | End: 2018-10-19 | Stop reason: SDUPTHER

## 2018-10-19 RX ORDER — SUCCINYLCHOLINE CHLORIDE 20 MG/ML
INJECTION INTRAMUSCULAR; INTRAVENOUS PRN
Status: DISCONTINUED | OUTPATIENT
Start: 2018-10-19 | End: 2018-10-19 | Stop reason: SDUPTHER

## 2018-10-19 RX ORDER — CALCIUM CARBONATE 200(500)MG
1 TABLET,CHEWABLE ORAL DAILY
Status: DISCONTINUED | OUTPATIENT
Start: 2018-10-19 | End: 2018-10-20 | Stop reason: HOSPADM

## 2018-10-19 RX ORDER — PROPOFOL 10 MG/ML
INJECTION, EMULSION INTRAVENOUS PRN
Status: DISCONTINUED | OUTPATIENT
Start: 2018-10-19 | End: 2018-10-19 | Stop reason: SDUPTHER

## 2018-10-19 RX ORDER — LIDOCAINE HYDROCHLORIDE 20 MG/ML
INJECTION, SOLUTION EPIDURAL; INFILTRATION; INTRACAUDAL; PERINEURAL PRN
Status: DISCONTINUED | OUTPATIENT
Start: 2018-10-19 | End: 2018-10-19 | Stop reason: SDUPTHER

## 2018-10-19 RX ORDER — OXYCODONE HYDROCHLORIDE 5 MG/1
10 TABLET ORAL PRN
Status: DISCONTINUED | OUTPATIENT
Start: 2018-10-19 | End: 2018-10-19

## 2018-10-19 RX ORDER — MEPERIDINE HYDROCHLORIDE 25 MG/ML
12.5 INJECTION INTRAMUSCULAR; INTRAVENOUS; SUBCUTANEOUS EVERY 5 MIN PRN
Status: DISCONTINUED | OUTPATIENT
Start: 2018-10-19 | End: 2018-10-19

## 2018-10-19 RX ORDER — METOPROLOL SUCCINATE 50 MG/1
50 TABLET, EXTENDED RELEASE ORAL DAILY
Status: DISCONTINUED | OUTPATIENT
Start: 2018-10-19 | End: 2018-10-20 | Stop reason: HOSPADM

## 2018-10-19 RX ORDER — ACETAMINOPHEN 325 MG/1
650 TABLET ORAL EVERY 4 HOURS PRN
Status: DISCONTINUED | OUTPATIENT
Start: 2018-10-19 | End: 2018-10-20 | Stop reason: HOSPADM

## 2018-10-19 RX ORDER — FUROSEMIDE 10 MG/ML
INJECTION INTRAMUSCULAR; INTRAVENOUS PRN
Status: DISCONTINUED | OUTPATIENT
Start: 2018-10-19 | End: 2018-10-19 | Stop reason: SDUPTHER

## 2018-10-19 RX ORDER — PANTOPRAZOLE SODIUM 40 MG/1
40 TABLET, DELAYED RELEASE ORAL
Status: DISCONTINUED | OUTPATIENT
Start: 2018-10-19 | End: 2018-10-20 | Stop reason: HOSPADM

## 2018-10-19 RX ADMIN — Medication 0.2 MG: at 07:36

## 2018-10-19 RX ADMIN — SUCCINYLCHOLINE CHLORIDE 120 MG: 20 INJECTION, SOLUTION INTRAMUSCULAR; INTRAVENOUS at 07:30

## 2018-10-19 RX ADMIN — HEPARIN SODIUM 10000 UNITS: 1000 INJECTION INTRAVENOUS; SUBCUTANEOUS at 08:03

## 2018-10-19 RX ADMIN — VECURONIUM BROMIDE 6 MG: 1 INJECTION, POWDER, LYOPHILIZED, FOR SOLUTION INTRAVENOUS at 07:46

## 2018-10-19 RX ADMIN — PROPOFOL 150 MG: 10 INJECTION, EMULSION INTRAVENOUS at 07:29

## 2018-10-19 RX ADMIN — EPHEDRINE SULFATE 10 MG: 50 INJECTION INTRAVENOUS at 07:35

## 2018-10-19 RX ADMIN — Medication 0.6 MG: at 09:49

## 2018-10-19 RX ADMIN — Medication 10 ML: at 21:13

## 2018-10-19 RX ADMIN — ISOPROTERENOL HYDROCHLORIDE 10 MCG/MIN: 0.2 INJECTION, SOLUTION INTRAMUSCULAR; INTRAVENOUS at 09:29

## 2018-10-19 RX ADMIN — LIDOCAINE HYDROCHLORIDE 100 MG: 20 INJECTION, SOLUTION EPIDURAL; INFILTRATION; INTRACAUDAL; PERINEURAL at 07:29

## 2018-10-19 RX ADMIN — Medication 0.2 MG: at 07:47

## 2018-10-19 RX ADMIN — VECURONIUM BROMIDE 1 MG: 1 INJECTION, POWDER, LYOPHILIZED, FOR SOLUTION INTRAVENOUS at 09:30

## 2018-10-19 RX ADMIN — PHENYLEPHRINE HYDROCHLORIDE 20 MCG/MIN: 10 INJECTION, SOLUTION INTRAMUSCULAR; INTRAVENOUS; SUBCUTANEOUS at 07:50

## 2018-10-19 RX ADMIN — EPHEDRINE SULFATE 10 MG: 50 INJECTION INTRAVENOUS at 07:45

## 2018-10-19 RX ADMIN — VECURONIUM BROMIDE 2 MG: 1 INJECTION, POWDER, LYOPHILIZED, FOR SOLUTION INTRAVENOUS at 09:01

## 2018-10-19 RX ADMIN — FENTANYL CITRATE 100 MCG: 50 INJECTION, SOLUTION INTRAMUSCULAR; INTRAVENOUS at 07:29

## 2018-10-19 RX ADMIN — HEPARIN SODIUM 5000 UNITS: 1000 INJECTION INTRAVENOUS; SUBCUTANEOUS at 08:53

## 2018-10-19 RX ADMIN — VECURONIUM BROMIDE 2 MG: 1 INJECTION, POWDER, LYOPHILIZED, FOR SOLUTION INTRAVENOUS at 08:31

## 2018-10-19 RX ADMIN — EPHEDRINE SULFATE 10 MG: 50 INJECTION INTRAVENOUS at 07:38

## 2018-10-19 RX ADMIN — PROTAMINE SULFATE 20 MG: 10 INJECTION, SOLUTION INTRAVENOUS at 10:15

## 2018-10-19 RX ADMIN — Medication 4 MG: at 09:49

## 2018-10-19 RX ADMIN — FUROSEMIDE 20 MG: 10 INJECTION, SOLUTION INTRAMUSCULAR; INTRAVENOUS at 09:46

## 2018-10-19 RX ADMIN — SODIUM CHLORIDE: 9 INJECTION, SOLUTION INTRAVENOUS at 07:20

## 2018-10-19 RX ADMIN — DEXAMETHASONE SODIUM PHOSPHATE 4 MG: 4 INJECTION, SOLUTION INTRA-ARTICULAR; INTRALESIONAL; INTRAMUSCULAR; INTRAVENOUS; SOFT TISSUE at 07:35

## 2018-10-19 RX ADMIN — APIXABAN 5 MG: 5 TABLET, FILM COATED ORAL at 21:13

## 2018-10-19 RX ADMIN — ONDANSETRON HYDROCHLORIDE 4 MG: 2 SOLUTION INTRAMUSCULAR; INTRAVENOUS at 09:50

## 2018-10-19 RX ADMIN — HEPARIN SODIUM 2000 UNITS: 1000 INJECTION INTRAVENOUS; SUBCUTANEOUS at 08:30

## 2018-10-19 ASSESSMENT — PULMONARY FUNCTION TESTS
PIF_VALUE: 18
PIF_VALUE: 19
PIF_VALUE: 1
PIF_VALUE: 2
PIF_VALUE: 19
PIF_VALUE: 1
PIF_VALUE: 18
PIF_VALUE: 19
PIF_VALUE: 17
PIF_VALUE: 17
PIF_VALUE: 19
PIF_VALUE: 19
PIF_VALUE: 18
PIF_VALUE: 1
PIF_VALUE: 14
PIF_VALUE: 19
PIF_VALUE: 18
PIF_VALUE: 1
PIF_VALUE: 18
PIF_VALUE: 18
PIF_VALUE: 16
PIF_VALUE: 18
PIF_VALUE: 15
PIF_VALUE: 1
PIF_VALUE: 15
PIF_VALUE: 19
PIF_VALUE: 18
PIF_VALUE: 18
PIF_VALUE: 15
PIF_VALUE: 18
PIF_VALUE: 19
PIF_VALUE: 19
PIF_VALUE: 14
PIF_VALUE: 16
PIF_VALUE: 0
PIF_VALUE: 19
PIF_VALUE: 0
PIF_VALUE: 18
PIF_VALUE: 17
PIF_VALUE: 17
PIF_VALUE: 19
PIF_VALUE: 15
PIF_VALUE: 18
PIF_VALUE: 15
PIF_VALUE: 19
PIF_VALUE: 14
PIF_VALUE: 18
PIF_VALUE: 18
PIF_VALUE: 17
PIF_VALUE: 17
PIF_VALUE: 1
PIF_VALUE: 15
PIF_VALUE: 1
PIF_VALUE: 18
PIF_VALUE: 18
PIF_VALUE: 17
PIF_VALUE: 19
PIF_VALUE: 18
PIF_VALUE: 18
PIF_VALUE: 19
PIF_VALUE: 18
PIF_VALUE: 1
PIF_VALUE: 0
PIF_VALUE: 18
PIF_VALUE: 18
PIF_VALUE: 20
PIF_VALUE: 27
PIF_VALUE: 19
PIF_VALUE: 1
PIF_VALUE: 17
PIF_VALUE: 18
PIF_VALUE: 1
PIF_VALUE: 19
PIF_VALUE: 17
PIF_VALUE: 19
PIF_VALUE: 18
PIF_VALUE: 18
PIF_VALUE: 19
PIF_VALUE: 15
PIF_VALUE: 2
PIF_VALUE: 1
PIF_VALUE: 19
PIF_VALUE: 18
PIF_VALUE: 19
PIF_VALUE: 18
PIF_VALUE: 18
PIF_VALUE: 15
PIF_VALUE: 19
PIF_VALUE: 18
PIF_VALUE: 18
PIF_VALUE: 19
PIF_VALUE: 1
PIF_VALUE: 11
PIF_VALUE: 14
PIF_VALUE: 18
PIF_VALUE: 19
PIF_VALUE: 19
PIF_VALUE: 1
PIF_VALUE: 18
PIF_VALUE: 18
PIF_VALUE: 19
PIF_VALUE: 18
PIF_VALUE: 19
PIF_VALUE: 18
PIF_VALUE: 31
PIF_VALUE: 18
PIF_VALUE: 19
PIF_VALUE: 18
PIF_VALUE: 18
PIF_VALUE: 14
PIF_VALUE: 21
PIF_VALUE: 18
PIF_VALUE: 19
PIF_VALUE: 19
PIF_VALUE: 18
PIF_VALUE: 18
PIF_VALUE: 19
PIF_VALUE: 18
PIF_VALUE: 19
PIF_VALUE: 18
PIF_VALUE: 15
PIF_VALUE: 15
PIF_VALUE: 18
PIF_VALUE: 2
PIF_VALUE: 17
PIF_VALUE: 18
PIF_VALUE: 18
PIF_VALUE: 19
PIF_VALUE: 18
PIF_VALUE: 19
PIF_VALUE: 17
PIF_VALUE: 18
PIF_VALUE: 18
PIF_VALUE: 26
PIF_VALUE: 0
PIF_VALUE: 19
PIF_VALUE: 19
PIF_VALUE: 18
PIF_VALUE: 19
PIF_VALUE: 18
PIF_VALUE: 1
PIF_VALUE: 17
PIF_VALUE: 18
PIF_VALUE: 19
PIF_VALUE: 18
PIF_VALUE: 17
PIF_VALUE: 15
PIF_VALUE: 18
PIF_VALUE: 13
PIF_VALUE: 18
PIF_VALUE: 2
PIF_VALUE: 0
PIF_VALUE: 18

## 2018-10-19 ASSESSMENT — PAIN DESCRIPTION - PAIN TYPE: TYPE: ACUTE PAIN

## 2018-10-19 ASSESSMENT — PAIN SCALES - GENERAL
PAINLEVEL_OUTOF10: 2
PAINLEVEL_OUTOF10: 0
PAINLEVEL_OUTOF10: 0

## 2018-10-19 ASSESSMENT — PAIN DESCRIPTION - LOCATION: LOCATION: THROAT

## 2018-10-19 NOTE — H&P
negative for - cough, sputum  · Cardiovascular: Reviewed in HPI  · Gastrointestinal: negative for - abdominal pain, diarrhea, N/V  · Hematology: negative for - bleeding, blood clots, bruising or jaundice  · Genito-Urinary:  negative for - Dysuria or incontinence  · Musculoskeletal: negative for - Joint swelling, muscle pain  · Neurological: negative for - confusion, dizziness, headaches   · Psychiatric: No anxiety, no depression. · Dermatological: negative for - rash    Physical Examination:  There were no vitals filed for this visit. · Constitutional: Oriented. No distress. · Head: Normocephalic and atraumatic. · Mouth/Throat: Oropharynx is clear and moist.   · Eyes: Conjunctivae normal. EOM are normal.   · Neck: Neck supple. No rigidity. No JVD present. · Cardiovascular: Normal rate, irregular rhythm, S1&S2. · Pulmonary/Chest: Bilateral respiratory sounds. No wheezes, No rhonchi. · Abdominal: Soft. Bowel sounds present. No distension, No tenderness. · Musculoskeletal: No tenderness. No edema    · Lymphadenopathy: Has no cervical adenopathy. · Neurological: Alert and oriented. Cranial nerve appears intact, No Gross deficit   · Skin: Skin is warm and dry. No rash noted. · Psychiatric: Has a normal behavior     Labs, diagnostic and imaging results reviewed.      ECG: atrial fibrillation 81  Echo: 5/18 normal stress echo  Cath: 2008-hx of NonQwave MI, s/p PCI of LAD, with nonobstructive disease in RCA and cfx,  NL EF    Medication:  Current Facility-Administered Medications   Medication Dose Route Frequency Provider Last Rate Last Dose    HYDROmorphone (DILAUDID) injection 0.25 mg  0.25 mg Intravenous Q5 Min PRN Katelyn Chino MD        fentaNYL (SUBLIMAZE) injection 50 mcg  50 mcg Intravenous Q5 Min PRN Katelyn Chino MD        HYDROmorphone (DILAUDID) injection 0.25 mg  0.25 mg Intravenous Q5 Min PRN Katelyn Chino MD        HYDROmorphone (DILAUDID) injection 0.5 mg  0.5 mg Intravenous

## 2018-10-19 NOTE — PROGRESS NOTES
Patient brought over to CVU from cath lab and attached to CVU monitoring. Report reviewed with cath lab RN. rt groin soft and no hematoma or oozing noted. Occlusive dressing dry and intact. Pedal pulses easily palpated. Primary RN Erickson Fair.

## 2018-10-19 NOTE — PROCEDURES
were prepped in a sterile fashion. We gained access in Right femoral vein. A 9-French sheath for ICE and 6F sheath for CS catheter and an Sr0 sheath for ablation and mapping catheters were  placed in the right femoral vein using modified seldinger technique. Using 3-D mapping system Carto the CS cathter was placed inside the coronary sinus  for recording and mapping of the left atrium. Using ICE we delineated the left pulmonary vein, left atrial appendage,  mitral valve, and right superior and right inferior pulmonary veins. Patient received a bolus of heparin prior transseptal puncture followed by continuous monitoring of the ACT and boluses of heparin during the procedure to keep the ACT more than 350. Also an esophageal temperature probe in addition to Esophastar catheter was advanced into the esophagus for mapping of the esophagus and careful monitoring of the esophageal temperature during ablation. A transseptal puncture through intact septum was done using ICE and  pressure monitoring . We placed a SR0 Sheaths inside the left atrium. Then a Circular catheter with deflectable curve and an irrigated ablation catheter was advanced into the left atrium sequentially and alternatively as needed. Using Penta ray  cathter and  Carto navigation system a three dimensional electro anatomical mapping of the left atrium, in addition to right and left sided pulmonary vein was created. Using Penta ray catheter voltage mapping of the left atrium was created. Also an esophageal temperature probe in addition to Esophastar catheter was advanced into the esophagus for mapping of the esophagus and careful monitoring of the esophageal temperature during ablation. The ICE was used to monitor location of temperature probe and move it close to ablation area. We stopped ablation when  temperature  increased 1 degree.     Then wide area circumferential ablation for right and left sided pulmonary veins were

## 2018-10-20 VITALS
HEIGHT: 70 IN | WEIGHT: 200 LBS | TEMPERATURE: 98.4 F | OXYGEN SATURATION: 95 % | BODY MASS INDEX: 28.63 KG/M2 | SYSTOLIC BLOOD PRESSURE: 121 MMHG | DIASTOLIC BLOOD PRESSURE: 80 MMHG | HEART RATE: 76 BPM | RESPIRATION RATE: 18 BRPM

## 2018-10-20 PROCEDURE — 6370000000 HC RX 637 (ALT 250 FOR IP): Performed by: INTERNAL MEDICINE

## 2018-10-20 PROCEDURE — 99217 PR OBSERVATION CARE DISCHARGE MANAGEMENT: CPT | Performed by: NURSE PRACTITIONER

## 2018-10-20 PROCEDURE — G0378 HOSPITAL OBSERVATION PER HR: HCPCS

## 2018-10-20 RX ORDER — PANTOPRAZOLE SODIUM 40 MG/1
40 TABLET, DELAYED RELEASE ORAL
Qty: 30 TABLET | Refills: 3 | Status: SHIPPED | OUTPATIENT
Start: 2018-10-21 | End: 2019-03-27

## 2018-10-20 RX ADMIN — ANTACID TABLETS 500 MG: 500 TABLET, CHEWABLE ORAL at 07:41

## 2018-10-20 RX ADMIN — APIXABAN 5 MG: 5 TABLET, FILM COATED ORAL at 07:41

## 2018-10-20 RX ADMIN — DESMOPRESSIN ACETATE 40 MG: 0.2 TABLET ORAL at 07:41

## 2018-10-20 RX ADMIN — ASPIRIN 81 MG: 81 TABLET, COATED ORAL at 07:41

## 2018-10-20 RX ADMIN — METOPROLOL SUCCINATE 50 MG: 50 TABLET, FILM COATED, EXTENDED RELEASE ORAL at 07:41

## 2018-10-20 RX ADMIN — PANTOPRAZOLE SODIUM 40 MG: 40 TABLET, DELAYED RELEASE ORAL at 07:41

## 2018-11-07 ENCOUNTER — OFFICE VISIT (OUTPATIENT)
Dept: CARDIOLOGY CLINIC | Age: 73
End: 2018-11-07
Payer: COMMERCIAL

## 2018-11-07 VITALS
HEIGHT: 70 IN | WEIGHT: 199.4 LBS | DIASTOLIC BLOOD PRESSURE: 80 MMHG | HEART RATE: 64 BPM | BODY MASS INDEX: 28.55 KG/M2 | SYSTOLIC BLOOD PRESSURE: 138 MMHG

## 2018-11-07 DIAGNOSIS — I10 ESSENTIAL HYPERTENSION: ICD-10-CM

## 2018-11-07 DIAGNOSIS — E78.5 HYPERLIPIDEMIA, UNSPECIFIED HYPERLIPIDEMIA TYPE: ICD-10-CM

## 2018-11-07 DIAGNOSIS — I48.0 PAF (PAROXYSMAL ATRIAL FIBRILLATION) (HCC): Primary | ICD-10-CM

## 2018-11-07 DIAGNOSIS — I48.3 TYPICAL ATRIAL FLUTTER (HCC): ICD-10-CM

## 2018-11-07 DIAGNOSIS — I25.10 CORONARY ARTERY DISEASE INVOLVING NATIVE HEART WITHOUT ANGINA PECTORIS, UNSPECIFIED VESSEL OR LESION TYPE: ICD-10-CM

## 2018-11-07 DIAGNOSIS — R00.2 HEART PALPITATIONS: ICD-10-CM

## 2018-11-07 PROCEDURE — 99214 OFFICE O/P EST MOD 30 MIN: CPT | Performed by: INTERNAL MEDICINE

## 2018-11-07 PROCEDURE — 93000 ELECTROCARDIOGRAM COMPLETE: CPT | Performed by: INTERNAL MEDICINE

## 2018-11-14 ENCOUNTER — OFFICE VISIT (OUTPATIENT)
Dept: CARDIOLOGY CLINIC | Age: 73
End: 2018-11-14
Payer: COMMERCIAL

## 2018-11-14 VITALS
DIASTOLIC BLOOD PRESSURE: 60 MMHG | HEIGHT: 70 IN | SYSTOLIC BLOOD PRESSURE: 108 MMHG | BODY MASS INDEX: 28.49 KG/M2 | HEART RATE: 58 BPM | WEIGHT: 199 LBS

## 2018-11-14 DIAGNOSIS — I25.10 CORONARY ARTERY DISEASE INVOLVING NATIVE HEART WITHOUT ANGINA PECTORIS, UNSPECIFIED VESSEL OR LESION TYPE: ICD-10-CM

## 2018-11-14 DIAGNOSIS — I10 ESSENTIAL HYPERTENSION: ICD-10-CM

## 2018-11-14 DIAGNOSIS — I48.3 TYPICAL ATRIAL FLUTTER (HCC): Primary | ICD-10-CM

## 2018-11-14 DIAGNOSIS — E78.2 MIXED HYPERLIPIDEMIA: ICD-10-CM

## 2018-11-14 DIAGNOSIS — E78.00 PURE HYPERCHOLESTEROLEMIA: ICD-10-CM

## 2018-11-14 PROCEDURE — 99214 OFFICE O/P EST MOD 30 MIN: CPT | Performed by: NURSE PRACTITIONER

## 2018-11-14 RX ORDER — ATORVASTATIN CALCIUM 40 MG/1
TABLET, FILM COATED ORAL
Qty: 90 TABLET | Refills: 3 | Status: SHIPPED | OUTPATIENT
Start: 2018-11-14 | End: 2019-12-02 | Stop reason: SDUPTHER

## 2018-11-14 RX ORDER — METOPROLOL SUCCINATE 50 MG/1
TABLET, EXTENDED RELEASE ORAL
Qty: 90 TABLET | Refills: 3 | Status: ON HOLD | OUTPATIENT
Start: 2018-11-14 | End: 2019-03-20 | Stop reason: HOSPADM

## 2018-11-14 NOTE — LETTER
[address] Cardiology [facility]  [address] [place] Breanne Puente [address] [address]  Phone: [phone]  Fax: [phone]    Shelton Covarrubias, APRN - CNP        November 21, 2018     Adrianne Courtney MD  [address] [facility]    Patient: Chaz Quintana  MR Number: [id number]  YOB: 1945  Date of Visit: 11/14/2018    Dear Dr. Adrianne Courtney:          [address]     Outpatient Follow Up Note    Subjective:   [facility] [place]:   Follow Up secondary   hyperlipidemia and coronary artery disease, and HTN       Chaz Quintana is 72 y.o. Is here for FU. He has been following with DR. Cha  For s/p ablation 10/19/18.  Pulmonary vein isolations using wide area circumferential radiofrequency ablation   Additional ablation of typical flutter CTI as a separate mechanism  May consider tikosyn or amiodarone if recurrence per Dr. Carmen Bailon. He is also treated for rectal cancer and his GI issues, but no bleeding   Currently the patient denies significant chest pain. There is no associated SMITH. The patient is not experiencing palpitations or dizziness. With regard to medication therapy the patient has been compliant with prescribed regimen   He had his GB removed then had colonoscopy and EGD which a polyp which was cancerous and then had surgery. He did not need any chemo or radiation     Cardiac angiogram 2008-hx of NonQwave MI, s/p PCI of LAD, with nonobstructive disease in RCA and cfx,  NL EF.     Past Medical History:    Past Medical History:   Diagnosis Date    Atrial fibrillation (HCC)     DOES NOT HAVE HYPERTENSION, PER PT     Social History:    History   Smoking Status    Current Every Day Smoker    Packs/day: 0.50    Years: 45.00    Types: Cigarettes   Smokeless Tobacco    Never Used     Current Medications:  Current Outpatient Prescriptions on File Prior to Visit   Medication Sig Dispense Refill  apixaban (ELIQUIS) 5 MG TABS tablet Take 1 tablet by mouth 2 times daily 60 tablet 6    pantoprazole (PROTONIX) 40 MG tablet Take 1 tablet by mouth every morning (before breakfast) 30 tablet 3    RaNITidine HCl (ZANTAC 150 MAXIMUM STRENGTH PO) Take by mouth      metoprolol succinate (TOPROL XL) 50 MG extended release tablet TAKE ONE TABLET BY MOUTH DAILY 90 tablet 3    atorvastatin (LIPITOR) 40 MG tablet TAKE 1 TABLET BY MOUTH DAILY 90 tablet 3    calcium carbonate (TUMS) 500 MG chewable tablet Take 1 tablet by mouth daily      aspirin EC 81 MG EC tablet Take 1 tablet by mouth daily. 30 tablet 11     No current facility-administered medications on file prior to visit. REVIEW OF SYSTEMS:    CONSTITUTIONAL: No major weight gain or loss, fatigue, weakness, night sweats or fever. HEENT: No new vision difficulties or ringing in the ears. RESPIRATORY: No new SOB, PND, orthopnea or cough. CARDIOVASCULAR: See HPI  GI: No nausea, vomiting, diarrhea, constipation, abdominal pain or changes in bowel habits. : No urinary frequency, urgency, incontinence hematuria or dysuria. SKIN: No cyanosis or skin lesions. MUSCULOSKELETAL: No new muscle or joint pain. NEUROLOGICAL: No syncope or TIA-like symptoms. PSYCHIATRIC: No anxiety, pain, insomnia or depression    Objective:   PHYSICAL EXAM:        VITALS:    Vitals:    11/14/18 1347   BP: 108/60   Pulse: 58       CONSTITUTIONAL: Cooperative, no apparent distress, and appears well nourished / developed  NEUROLOGIC:  Awake and orientated to person, place and time. PSYCH: Calm affect. SKIN: Warm and dry. HEENT: Sclera non-icteric, normocephalic, neck supple, no elevation of JVP, normal carotid pulses with no bruits and thyroid normal size.   LUNGS:  No increased work of breathing and clear to auscultation, no crackles or wheezing  CARDIOVASCULAR:  Regular rate and rhythm with no murmurs, gallops, rubs, Products available for smoking cessation were discussed in detail. Discussed exercise, patient golf's 3 times a week at [facility]. Low saturated fat diet. Thank you for allowing to us to participate in the care of Aleksander Cotto. If you have questions, please do not hesitate to call me. I look forward to following Heather Patricia along with you.     Sincerely,        Srinath Davis, GARCÍA - CNP

## 2018-12-17 ENCOUNTER — TELEPHONE (OUTPATIENT)
Dept: CARDIOLOGY CLINIC | Age: 73
End: 2018-12-17

## 2018-12-26 ENCOUNTER — OFFICE VISIT (OUTPATIENT)
Dept: CARDIOLOGY CLINIC | Age: 73
End: 2018-12-26
Payer: COMMERCIAL

## 2018-12-26 VITALS
WEIGHT: 201.8 LBS | HEIGHT: 70 IN | BODY MASS INDEX: 28.89 KG/M2 | HEART RATE: 54 BPM | SYSTOLIC BLOOD PRESSURE: 125 MMHG | DIASTOLIC BLOOD PRESSURE: 73 MMHG

## 2018-12-26 DIAGNOSIS — R00.2 HEART PALPITATIONS: ICD-10-CM

## 2018-12-26 DIAGNOSIS — I10 BENIGN ESSENTIAL HTN: ICD-10-CM

## 2018-12-26 DIAGNOSIS — E78.2 MIXED HYPERLIPIDEMIA: ICD-10-CM

## 2018-12-26 DIAGNOSIS — I48.3 TYPICAL ATRIAL FLUTTER (HCC): ICD-10-CM

## 2018-12-26 DIAGNOSIS — I48.0 PAF (PAROXYSMAL ATRIAL FIBRILLATION) (HCC): Primary | ICD-10-CM

## 2018-12-26 DIAGNOSIS — I25.10 CORONARY ARTERY DISEASE INVOLVING NATIVE HEART WITHOUT ANGINA PECTORIS, UNSPECIFIED VESSEL OR LESION TYPE: ICD-10-CM

## 2018-12-26 PROCEDURE — 99214 OFFICE O/P EST MOD 30 MIN: CPT | Performed by: INTERNAL MEDICINE

## 2018-12-26 PROCEDURE — 93000 ELECTROCARDIOGRAM COMPLETE: CPT | Performed by: INTERNAL MEDICINE

## 2018-12-26 NOTE — PROGRESS NOTES
[facility]   Electrophysiology Follow up  Date: 12/26/2018  I had the privilege of visiting Marta Vo in the office for follow up after ablation    CC:   afib    HPI:   Marta Vo is a 72 y.o. male being seen in follow up s/p ablation for Afib. Followed since diagnosed with AF and starting eliquis.     Cardiac angiogram 2008-hx of NonQwave MI, s/p PCI of LAD, with nonobstructive disease in RCA and cfx,  NL EF. He had an episode of Afib in 2001. He has also been treated for rectal cancer. 8/1/2018 at 4 pm, 6 holes into golfing, he developed a headache and overall not feeling well when in Afib. Event monitor showed afib(13 percent) aflutter, stress echo normal.  He was started on eliquis 6/27/18. He has not had any chest pain, sob. He has had palpitations for several years and just getting worse. 10/19/2018 underwent Afib Ablation with PVI and Typical Flutter ablation with CTI as a separate mechanism. 12/16/18 admitted to [facility] for Afib RVR, converted overnight with Cardizem drip. Interval History: Today patient presents to office for follow up s/p recent hospitalization for Afib RVR which spontaneously converted. He had chest tightness and shortness of breath with it. . Patient is still in the blanking period S/p Fib/Flutter Ablation on 10/19/18. Patient was advised of blanking period during these first three months. Patient expressed concerns that he wore MCOT with lots of asymptomatic episodes of Afib. This time he was very symptomatic. He states that he was awoken from sleep. Discussed original plan of continuing to monitor and proceed with MCOT in February. Otherwise, can start on medication regimen to decrease episodes. Patient did not like this plan for feeling that it would mask symptoms. Lastly, discussed taking another 1/2 pill of metoprolol during episode and monitor.             Past Medical History:   Diagnosis Date    Atrial fibrillation ([address]     DOES

## 2019-01-18 ENCOUNTER — TELEPHONE (OUTPATIENT)
Dept: CARDIOLOGY CLINIC | Age: 74
End: 2019-01-18

## 2019-01-18 ENCOUNTER — NURSE ONLY (OUTPATIENT)
Dept: CARDIOLOGY CLINIC | Age: 74
End: 2019-01-18
Payer: COMMERCIAL

## 2019-01-18 DIAGNOSIS — R00.2 PALPITATIONS: Primary | ICD-10-CM

## 2019-01-18 DIAGNOSIS — I48.0 PAF (PAROXYSMAL ATRIAL FIBRILLATION) (HCC): Primary | ICD-10-CM

## 2019-01-18 DIAGNOSIS — I48.0 PAF (PAROXYSMAL ATRIAL FIBRILLATION) (HCC): ICD-10-CM

## 2019-01-18 PROCEDURE — 93000 ELECTROCARDIOGRAM COMPLETE: CPT | Performed by: INTERNAL MEDICINE

## 2019-03-01 PROCEDURE — 93228 REMOTE 30 DAY ECG REV/REPORT: CPT | Performed by: INTERNAL MEDICINE

## 2019-03-12 ENCOUNTER — TELEPHONE (OUTPATIENT)
Dept: CARDIOLOGY CLINIC | Age: 74
End: 2019-03-12

## 2019-03-19 ENCOUNTER — HOSPITAL ENCOUNTER (OUTPATIENT)
Age: 74
Setting detail: OBSERVATION
Discharge: HOME | End: 2019-03-20
Attending: INTERNAL MEDICINE | Admitting: INTERNAL MEDICINE
Payer: MEDICARE

## 2019-03-19 DIAGNOSIS — E78.00 PURE HYPERCHOLESTEROLEMIA: ICD-10-CM

## 2019-03-19 PROBLEM — R07.9 CHEST PAIN: Status: ACTIVE | Noted: 2019-03-19

## 2019-03-19 LAB
A/G RATIO: 1.2 (ref 1.1–2.2)
ALBUMIN SERPL-MCNC: 3.7 G/DL (ref 3.4–5)
ALP BLD-CCNC: 73 U/L (ref 40–129)
ALT SERPL-CCNC: 13 U/L (ref 10–40)
ANION GAP SERPL CALCULATED.3IONS-SCNC: 9 MMOL/L (ref 3–16)
AST SERPL-CCNC: 12 U/L (ref 15–37)
BILIRUB SERPL-MCNC: 0.6 MG/DL (ref 0–1)
BUN BLDV-MCNC: 16 MG/DL (ref 7–20)
C-REACTIVE PROTEIN: 64.3 MG/L (ref 0–5.1)
CALCIUM SERPL-MCNC: 8.7 MG/DL (ref 8.3–10.6)
CHLORIDE BLD-SCNC: 101 MMOL/L (ref 99–110)
CO2: 27 MMOL/L (ref 21–32)
CREAT SERPL-MCNC: 1 MG/DL (ref 0.8–1.3)
EKG ATRIAL RATE: 56 BPM
EKG DIAGNOSIS: NORMAL
EKG P AXIS: 32 DEGREES
EKG P-R INTERVAL: 170 MS
EKG Q-T INTERVAL: 400 MS
EKG QRS DURATION: 90 MS
EKG QTC CALCULATION (BAZETT): 386 MS
EKG R AXIS: -13 DEGREES
EKG T AXIS: 52 DEGREES
EKG VENTRICULAR RATE: 56 BPM
GFR AFRICAN AMERICAN: >60
GFR NON-AFRICAN AMERICAN: >60
GLOBULIN: 3 G/DL
GLUCOSE BLD-MCNC: 105 MG/DL (ref 70–99)
HCT VFR BLD CALC: 39.2 % (ref 40.5–52.5)
HEMOGLOBIN: 13.2 G/DL (ref 13.5–17.5)
MCH RBC QN AUTO: 29.1 PG (ref 26–34)
MCHC RBC AUTO-ENTMCNC: 33.6 G/DL (ref 31–36)
MCV RBC AUTO: 86.7 FL (ref 80–100)
PDW BLD-RTO: 14.5 % (ref 12.4–15.4)
PLATELET # BLD: 174 K/UL (ref 135–450)
PMV BLD AUTO: 8.7 FL (ref 5–10.5)
POTASSIUM SERPL-SCNC: 4 MMOL/L (ref 3.5–5.1)
RBC # BLD: 4.52 M/UL (ref 4.2–5.9)
SEDIMENTATION RATE, ERYTHROCYTE: 33 MM/HR (ref 0–20)
SODIUM BLD-SCNC: 137 MMOL/L (ref 136–145)
TOTAL PROTEIN: 6.7 G/DL (ref 6.4–8.2)
TROPONIN: <0.01 NG/ML
WBC # BLD: 8.8 K/UL (ref 4–11)

## 2019-03-19 PROCEDURE — 36415 COLL VENOUS BLD VENIPUNCTURE: CPT

## 2019-03-19 PROCEDURE — 85027 COMPLETE CBC AUTOMATED: CPT

## 2019-03-19 PROCEDURE — 99245 OFF/OP CONSLTJ NEW/EST HI 55: CPT | Performed by: INTERNAL MEDICINE

## 2019-03-19 PROCEDURE — G0378 HOSPITAL OBSERVATION PER HR: HCPCS

## 2019-03-19 PROCEDURE — 93010 ELECTROCARDIOGRAM REPORT: CPT | Performed by: INTERNAL MEDICINE

## 2019-03-19 PROCEDURE — 84484 ASSAY OF TROPONIN QUANT: CPT

## 2019-03-19 PROCEDURE — 6370000000 HC RX 637 (ALT 250 FOR IP): Performed by: HOSPITALIST

## 2019-03-19 PROCEDURE — 86140 C-REACTIVE PROTEIN: CPT

## 2019-03-19 PROCEDURE — 80053 COMPREHEN METABOLIC PANEL: CPT

## 2019-03-19 PROCEDURE — 93005 ELECTROCARDIOGRAM TRACING: CPT | Performed by: INTERNAL MEDICINE

## 2019-03-19 PROCEDURE — 85652 RBC SED RATE AUTOMATED: CPT

## 2019-03-19 PROCEDURE — 2580000003 HC RX 258: Performed by: INTERNAL MEDICINE

## 2019-03-19 PROCEDURE — G0378 HOSPITAL OBSERVATION PER HR: HCPCS | Performed by: INTERNAL MEDICINE

## 2019-03-19 RX ORDER — SODIUM CHLORIDE 0.9 % (FLUSH) 0.9 %
10 SYRINGE (ML) INJECTION PRN
Status: DISCONTINUED | OUTPATIENT
Start: 2019-03-19 | End: 2019-03-20 | Stop reason: HOSPADM

## 2019-03-19 RX ORDER — ATORVASTATIN CALCIUM 40 MG/1
1 TABLET, FILM COATED ORAL DAILY
Status: DISCONTINUED | OUTPATIENT
Start: 2019-03-19 | End: 2019-03-19

## 2019-03-19 RX ORDER — ONDANSETRON 2 MG/ML
4 INJECTION INTRAMUSCULAR; INTRAVENOUS EVERY 6 HOURS PRN
Status: DISCONTINUED | OUTPATIENT
Start: 2019-03-19 | End: 2019-03-20 | Stop reason: HOSPADM

## 2019-03-19 RX ORDER — SODIUM CHLORIDE 0.9 % (FLUSH) 0.9 %
10 SYRINGE (ML) INJECTION EVERY 12 HOURS SCHEDULED
Status: DISCONTINUED | OUTPATIENT
Start: 2019-03-19 | End: 2019-03-20 | Stop reason: HOSPADM

## 2019-03-19 RX ORDER — METOPROLOL SUCCINATE 50 MG/1
50 TABLET, EXTENDED RELEASE ORAL DAILY
Status: DISCONTINUED | OUTPATIENT
Start: 2019-03-19 | End: 2019-03-20

## 2019-03-19 RX ORDER — ASPIRIN 81 MG/1
81 TABLET, CHEWABLE ORAL DAILY
Status: DISCONTINUED | OUTPATIENT
Start: 2019-03-20 | End: 2019-03-20 | Stop reason: HOSPADM

## 2019-03-19 RX ORDER — SODIUM CHLORIDE 9 MG/ML
INJECTION, SOLUTION INTRAVENOUS CONTINUOUS
Status: DISCONTINUED | OUTPATIENT
Start: 2019-03-19 | End: 2019-03-20 | Stop reason: HOSPADM

## 2019-03-19 RX ORDER — ASPIRIN 81 MG/1
81 TABLET, CHEWABLE ORAL DAILY
Status: DISCONTINUED | OUTPATIENT
Start: 2019-03-19 | End: 2019-03-19

## 2019-03-19 RX ORDER — SODIUM CHLORIDE 0.9 % (FLUSH) 0.9 %
10 SYRINGE (ML) INJECTION PRN
Status: DISCONTINUED | OUTPATIENT
Start: 2019-03-19 | End: 2019-03-20 | Stop reason: SDUPTHER

## 2019-03-19 RX ORDER — NITROGLYCERIN 0.4 MG/1
0.4 TABLET SUBLINGUAL EVERY 5 MIN PRN
Status: DISCONTINUED | OUTPATIENT
Start: 2019-03-19 | End: 2019-03-20 | Stop reason: HOSPADM

## 2019-03-19 RX ORDER — SODIUM CHLORIDE 0.9 % (FLUSH) 0.9 %
10 SYRINGE (ML) INJECTION EVERY 12 HOURS SCHEDULED
Status: DISCONTINUED | OUTPATIENT
Start: 2019-03-19 | End: 2019-03-20 | Stop reason: SDUPTHER

## 2019-03-19 RX ORDER — PANTOPRAZOLE SODIUM 40 MG/1
40 TABLET, DELAYED RELEASE ORAL
Status: DISCONTINUED | OUTPATIENT
Start: 2019-03-19 | End: 2019-03-20 | Stop reason: HOSPADM

## 2019-03-19 RX ORDER — RANITIDINE 150 MG/1
150 TABLET ORAL 2 TIMES DAILY
Status: DISCONTINUED | OUTPATIENT
Start: 2019-03-19 | End: 2019-03-19

## 2019-03-19 RX ORDER — MORPHINE SULFATE 4 MG/ML
4 INJECTION, SOLUTION INTRAMUSCULAR; INTRAVENOUS EVERY 4 HOURS PRN
Status: DISCONTINUED | OUTPATIENT
Start: 2019-03-19 | End: 2019-03-20 | Stop reason: HOSPADM

## 2019-03-19 RX ORDER — ATORVASTATIN CALCIUM 40 MG/1
40 TABLET, FILM COATED ORAL NIGHTLY
Status: DISCONTINUED | OUTPATIENT
Start: 2019-03-19 | End: 2019-03-20 | Stop reason: HOSPADM

## 2019-03-19 RX ADMIN — Medication 10 ML: at 21:17

## 2019-03-19 RX ADMIN — DESMOPRESSIN ACETATE 40 MG: 0.2 TABLET ORAL at 21:16

## 2019-03-19 RX ADMIN — METOPROLOL SUCCINATE 50 MG: 50 TABLET, FILM COATED, EXTENDED RELEASE ORAL at 14:13

## 2019-03-19 RX ADMIN — PANTOPRAZOLE SODIUM 40 MG: 40 TABLET, DELAYED RELEASE ORAL at 08:46

## 2019-03-19 RX ADMIN — SODIUM CHLORIDE: 9 INJECTION, SOLUTION INTRAVENOUS at 21:17

## 2019-03-19 ASSESSMENT — PAIN SCALES - GENERAL
PAINLEVEL_OUTOF10: 2
PAINLEVEL_OUTOF10: 0

## 2019-03-19 ASSESSMENT — PAIN DESCRIPTION - LOCATION
LOCATION: CHEST
LOCATION: CHEST

## 2019-03-19 ASSESSMENT — PAIN DESCRIPTION - PAIN TYPE
TYPE: ACUTE PAIN
TYPE: ACUTE PAIN

## 2019-03-19 ASSESSMENT — PAIN DESCRIPTION - ORIENTATION
ORIENTATION: LEFT
ORIENTATION: LEFT

## 2019-03-20 VITALS
TEMPERATURE: 98.2 F | WEIGHT: 195 LBS | SYSTOLIC BLOOD PRESSURE: 116 MMHG | DIASTOLIC BLOOD PRESSURE: 73 MMHG | OXYGEN SATURATION: 98 % | HEART RATE: 60 BPM | HEIGHT: 70 IN | RESPIRATION RATE: 12 BRPM | BODY MASS INDEX: 27.92 KG/M2

## 2019-03-20 LAB
HCT VFR BLD CALC: 38.6 % (ref 40.5–52.5)
HEMOGLOBIN: 13 G/DL (ref 13.5–17.5)
LEFT VENTRICULAR EJECTION FRACTION HIGH VALUE: 65 %
LEFT VENTRICULAR EJECTION FRACTION MODE: NORMAL
LEFT VENTRICULAR EJECTION FRACTION MODE: NORMAL
LV EF: 55 %
LV EF: 60 %
LV EF: 63 %
LVEF MODALITY: NORMAL
MCH RBC QN AUTO: 29.2 PG (ref 26–34)
MCHC RBC AUTO-ENTMCNC: 33.7 G/DL (ref 31–36)
MCV RBC AUTO: 86.5 FL (ref 80–100)
PDW BLD-RTO: 14.5 % (ref 12.4–15.4)
PLATELET # BLD: 178 K/UL (ref 135–450)
PMV BLD AUTO: 8.4 FL (ref 5–10.5)
RBC # BLD: 4.46 M/UL (ref 4.2–5.9)
WBC # BLD: 9.1 K/UL (ref 4–11)

## 2019-03-20 PROCEDURE — 93306 TTE W/DOPPLER COMPLETE: CPT

## 2019-03-20 PROCEDURE — 99024 POSTOP FOLLOW-UP VISIT: CPT | Performed by: INTERNAL MEDICINE

## 2019-03-20 PROCEDURE — 36415 COLL VENOUS BLD VENIPUNCTURE: CPT

## 2019-03-20 PROCEDURE — 99153 MOD SED SAME PHYS/QHP EA: CPT

## 2019-03-20 PROCEDURE — 6370000000 HC RX 637 (ALT 250 FOR IP): Performed by: INTERNAL MEDICINE

## 2019-03-20 PROCEDURE — C1769 GUIDE WIRE: HCPCS

## 2019-03-20 PROCEDURE — 93458 L HRT ARTERY/VENTRICLE ANGIO: CPT | Performed by: INTERNAL MEDICINE

## 2019-03-20 PROCEDURE — 85027 COMPLETE CBC AUTOMATED: CPT

## 2019-03-20 PROCEDURE — 6360000002 HC RX W HCPCS

## 2019-03-20 PROCEDURE — G0378 HOSPITAL OBSERVATION PER HR: HCPCS

## 2019-03-20 PROCEDURE — 2709999900 HC NON-CHARGEABLE SUPPLY

## 2019-03-20 PROCEDURE — 99152 MOD SED SAME PHYS/QHP 5/>YRS: CPT

## 2019-03-20 PROCEDURE — 2580000003 HC RX 258: Performed by: INTERNAL MEDICINE

## 2019-03-20 PROCEDURE — 6360000004 HC RX CONTRAST MEDICATION: Performed by: INTERNAL MEDICINE

## 2019-03-20 PROCEDURE — 93459 L HRT ART/GRFT ANGIO: CPT

## 2019-03-20 PROCEDURE — 2500000003 HC RX 250 WO HCPCS

## 2019-03-20 PROCEDURE — C1894 INTRO/SHEATH, NON-LASER: HCPCS

## 2019-03-20 RX ORDER — SODIUM CHLORIDE 9 MG/ML
INJECTION, SOLUTION INTRAVENOUS CONTINUOUS
Status: ACTIVE | OUTPATIENT
Start: 2019-03-20 | End: 2019-03-20

## 2019-03-20 RX ORDER — ACETAMINOPHEN 325 MG/1
650 TABLET ORAL EVERY 4 HOURS PRN
Status: DISCONTINUED | OUTPATIENT
Start: 2019-03-20 | End: 2019-03-20 | Stop reason: HOSPADM

## 2019-03-20 RX ORDER — METOPROLOL SUCCINATE 25 MG/1
25 TABLET, EXTENDED RELEASE ORAL DAILY
Qty: 30 TABLET | Refills: 3 | Status: ON HOLD | OUTPATIENT
Start: 2019-03-21 | End: 2019-07-13 | Stop reason: HOSPADM

## 2019-03-20 RX ORDER — METOPROLOL SUCCINATE 25 MG/1
25 TABLET, EXTENDED RELEASE ORAL DAILY PRN
Status: DISCONTINUED | OUTPATIENT
Start: 2019-03-20 | End: 2019-03-20 | Stop reason: HOSPADM

## 2019-03-20 RX ORDER — SODIUM CHLORIDE 0.9 % (FLUSH) 0.9 %
10 SYRINGE (ML) INJECTION PRN
Status: DISCONTINUED | OUTPATIENT
Start: 2019-03-20 | End: 2019-03-20 | Stop reason: HOSPADM

## 2019-03-20 RX ORDER — METOPROLOL SUCCINATE 25 MG/1
25 TABLET, EXTENDED RELEASE ORAL DAILY
Status: DISCONTINUED | OUTPATIENT
Start: 2019-03-21 | End: 2019-03-20 | Stop reason: HOSPADM

## 2019-03-20 RX ORDER — METOPROLOL SUCCINATE 25 MG/1
25 TABLET, EXTENDED RELEASE ORAL DAILY PRN
Qty: 30 TABLET | Refills: 3 | Status: SHIPPED | OUTPATIENT
Start: 2019-03-20 | End: 2019-03-27

## 2019-03-20 RX ORDER — SODIUM CHLORIDE 0.9 % (FLUSH) 0.9 %
10 SYRINGE (ML) INJECTION EVERY 12 HOURS SCHEDULED
Status: DISCONTINUED | OUTPATIENT
Start: 2019-03-20 | End: 2019-03-20 | Stop reason: HOSPADM

## 2019-03-20 RX ORDER — METOPROLOL SUCCINATE 25 MG/1
25 TABLET, EXTENDED RELEASE ORAL ONCE
Status: COMPLETED | OUTPATIENT
Start: 2019-03-20 | End: 2019-03-20

## 2019-03-20 RX ADMIN — ASPIRIN 81 MG 81 MG: 81 TABLET ORAL at 12:18

## 2019-03-20 RX ADMIN — IOPAMIDOL 64 ML: 755 INJECTION, SOLUTION INTRAVENOUS at 09:21

## 2019-03-20 RX ADMIN — METOPROLOL SUCCINATE 25 MG: 25 TABLET, FILM COATED, EXTENDED RELEASE ORAL at 12:21

## 2019-03-20 RX ADMIN — SODIUM CHLORIDE: 9 INJECTION, SOLUTION INTRAVENOUS at 09:20

## 2019-03-20 RX ADMIN — Medication 10 ML: at 09:20

## 2019-03-20 ASSESSMENT — PAIN SCALES - GENERAL
PAINLEVEL_OUTOF10: 0

## 2019-03-27 ENCOUNTER — OFFICE VISIT (OUTPATIENT)
Dept: CARDIOLOGY CLINIC | Age: 74
End: 2019-03-27
Payer: COMMERCIAL

## 2019-03-27 VITALS
HEART RATE: 60 BPM | WEIGHT: 199 LBS | HEIGHT: 70 IN | DIASTOLIC BLOOD PRESSURE: 64 MMHG | BODY MASS INDEX: 28.49 KG/M2 | SYSTOLIC BLOOD PRESSURE: 108 MMHG

## 2019-03-27 DIAGNOSIS — I48.0 PAF (PAROXYSMAL ATRIAL FIBRILLATION) (HCC): ICD-10-CM

## 2019-03-27 DIAGNOSIS — R00.2 HEART PALPITATIONS: ICD-10-CM

## 2019-03-27 DIAGNOSIS — I48.3 TYPICAL ATRIAL FLUTTER (HCC): ICD-10-CM

## 2019-03-27 DIAGNOSIS — I25.118 CORONARY ARTERY DISEASE OF NATIVE ARTERY OF NATIVE HEART WITH STABLE ANGINA PECTORIS (HCC): Primary | ICD-10-CM

## 2019-03-27 PROCEDURE — 93000 ELECTROCARDIOGRAM COMPLETE: CPT | Performed by: NURSE PRACTITIONER

## 2019-03-27 PROCEDURE — 99214 OFFICE O/P EST MOD 30 MIN: CPT | Performed by: NURSE PRACTITIONER

## 2019-03-27 NOTE — PATIENT INSTRUCTIONS
1) Continue taking Toprol 25 mg once daily.       2) Take diltiazem 30 mg as needed for rapid heart rate / AF symtoms    3) I will talk to Dr. Kailyn Gaspar about future options for you -Tikosyn, vs repeat ablation, vs repeat monitor and maybe ppm for tachy/alexis and call you at [phone]

## 2019-03-27 NOTE — PROGRESS NOTES
[address]   Electrophysiology   Date: 3/27/2019  I had the privilege of visiting Seth Neil in the office. CC: A fib  HPI:   Previous note Dr. Guerita Yi 12/26/18   Seth Neil is a 72 y.o. male being seen in follow up s/p ablation for Afib. Followed since diagnosed with AF and starting eliquis. Cardiac angiogram 2008-hx of NonQwave MI, s/p PCI of LAD, with nonobstructive disease in RCA and cfx,  NL EF. He had an episode of Afib in 2001. He has also been treated for rectal cancer. 8/1/2018 at 4 pm, 6 holes into golfing, he developed a headache and overall not feeling well when in Afib. Event monitor showed afib(13 percent) aflutter, stress echo normal.  He was started on eliquis 6/27/18. He has not had any chest pain, sob. He has had palpitations for several years and just getting worse. 10/19/2018 underwent Afib Ablation with PVI and Typical Flutter ablation with CTI as a separate mechanism. 12/16/18 admitted to [facility] for Afib RVR, converted overnight with Cardizem drip. Interval History: Today patient presents to office for follow up s/p recent hospitalization for Afib RVR which spontaneously converted. He had chest tightness and shortness of breath with it. . Patient is still in the blanking period S/p Fib/Flutter Ablation on 10/19/18. Patient was advised of blanking period during these first three months. Patient expressed concerns that he wore MCOT with lots of asymptomatic episodes of Afib. This time he was very symptomatic. He states that he was awoken from sleep. Discussed original plan of continuing to monitor and proceed with MCOT in February. Otherwise, can start on medication regimen to decrease episodes. Patient did not like this plan for feeling that it would mask symptoms. Lastly, discussed taking another 1/2 pill of metoprolol during episode and monitor.     Interval History:   Was recently hospitalized for AF w/RVR, after blanking period post ablation. Here for follow up and medication management. He is annoyed that he keeps going back in AF. Feels tired, no energy over the last 6 months. Denies symptoms as serious as that which brought him to the ER recently. OT 1/21/19-2/19/19 AF burden 6%. EKG SB rate 57. Has been taking an additional Toprol for AF symptoms. Will continue Toprol 25 and Eliquis 5. Will start IR diltiazem 30 mg prn symptoms. Past Medical History:   Diagnosis Date    Atrial fibrillation ([address]     DOES NOT HAVE HYPERTENSION, PER PT        Past Surgical History:   Procedure Laterality Date    CHOLECYSTECTOMY, LAPAROSCOPIC  1/13/16    LAPAROSCOPIC CHOLECYSTECTOMY WITH CHOLANGIOGRAM    [facility]    CORONARY ANGIOPLASTY WITH STENT PLACEMENT  2008    TONSILLECTOMY  1955    TOOTH EXTRACTION  2013    [facility]       Allergies:  No Known Allergies    Social History:  Reviewed. reports that he has been smoking cigarettes. He has a 22.50 pack-year smoking history. He has never used smokeless tobacco. He reports that he drinks about 6.0 oz of alcohol per week. He reports that he does not use drugs. Family History:  Reviewed. family history includes Heart Disease in his mother. Review of System:  All other systems reviewed and are negative except for that noted above.  Pertinent negatives are:     · General: negative for fever, chills   · Ophthalmic ROS: negative for - eye pain or loss of vision  · ENT ROS: negative for - headaches, sore throat   · Respiratory: negative for - cough, sputum  · Cardiovascular: Reviewed in HPI  · Gastrointestinal: negative for - abdominal pain, diarrhea, N/V  · Hematology: negative for - bleeding, blood clots, bruising or jaundice  · Genito-Urinary:  negative for - Dysuria or incontinence  · Musculoskeletal: negative for - Joint swelling, muscle pain  · Neurological: negative for - confusion, dizziness, headaches   · Psychiatric: No anxiety, no leaflets appear thickened. Echo: 5/18 normal stress echo     Cath: 2008-hx of NonQwave MI, s/p PCI of LAD, with nonobstructive disease in RCA and cfx,  NL EF       Medication:  Current Outpatient Medications   Medication Sig Dispense Refill    metoprolol succinate (TOPROL XL) 25 MG extended release tablet Take 1 tablet by mouth daily 30 tablet 3    atorvastatin (LIPITOR) 40 MG tablet TAKE 1 TABLET BY MOUTH DAILY 90 tablet 3    apixaban (ELIQUIS) 5 MG TABS tablet Take 1 tablet by mouth 2 times daily 60 tablet 6    RaNITidine HCl (ZANTAC 150 MAXIMUM STRENGTH PO) Take by mouth      calcium carbonate (TUMS) 500 MG chewable tablet Take 1 tablet by mouth daily      aspirin EC 81 MG EC tablet Take 1 tablet by mouth daily. 30 tablet 11    metoprolol succinate (TOPROL XL) 25 MG extended release tablet Take 1 tablet by mouth daily as needed (when in a fib) 30 tablet 3    pantoprazole (PROTONIX) 40 MG tablet Take 1 tablet by mouth every morning (before breakfast) 30 tablet 3     No current facility-administered medications for this visit. Assessment and plan:     Atrial Fibrillation  -(10/19/18) s/p AF/AFl ablation (PVI and typical AFl)  -Symtomatic  -Was recently hospitalized for AF w/RVR, after blanking period post ablation.    -Feels tired, no energy over the last 6 months. Denies symptoms as serious as that which brought him to the ER recently. -MCOT 1/21/19-2/19/19 AF burden 6%. EKG SB rate 57.    -Has been taking an additional Toprol for AF symptoms. -->Stop taking additional Toprol (or any long acting AVNB) for symptoms  -Will continue Toprol 25 and Eliquis 5.    -Will start IR diltiazem 30 mg prn symptoms. 1) Continue taking Toprol 25 mg once daily.       2) Take diltiazem 30 mg as needed for rapid heart rate / AF symtoms    3) I will talk to Dr. Sameer Haq about future options for you -Tikosyn, vs repeat ablation and call you at [phone]    --->Discussed w/Dr. Sameer Haq, rec repeat ablation. Called Mr. Leonayne Abelino, he will likely proceed, but wants to talk to Dr. Rosario Peters and get more exact chances of success.   He wants to avoid antiarrythmic management, is ok w/a 2nd ablation, but not a 3rd    I independently reviewed*[address]   Office: [phone]

## 2019-04-08 ENCOUNTER — OFFICE VISIT (OUTPATIENT)
Dept: PRIMARY CARE CLINIC | Age: 74
End: 2019-04-08
Payer: MEDICARE

## 2019-04-08 VITALS
WEIGHT: 195.9 LBS | HEART RATE: 70 BPM | DIASTOLIC BLOOD PRESSURE: 62 MMHG | BODY MASS INDEX: 28.11 KG/M2 | OXYGEN SATURATION: 98 % | SYSTOLIC BLOOD PRESSURE: 110 MMHG

## 2019-04-08 DIAGNOSIS — E78.5 HYPERLIPIDEMIA, UNSPECIFIED HYPERLIPIDEMIA TYPE: ICD-10-CM

## 2019-04-08 DIAGNOSIS — I25.10 CORONARY ARTERY DISEASE INVOLVING NATIVE HEART WITHOUT ANGINA PECTORIS, UNSPECIFIED VESSEL OR LESION TYPE: ICD-10-CM

## 2019-04-08 DIAGNOSIS — K21.00 GASTROESOPHAGEAL REFLUX DISEASE WITH ESOPHAGITIS: Primary | ICD-10-CM

## 2019-04-08 DIAGNOSIS — I10 ESSENTIAL HYPERTENSION: ICD-10-CM

## 2019-04-08 DIAGNOSIS — I48.19 ATRIAL FIBRILLATION, PERSISTENT (HCC): ICD-10-CM

## 2019-04-08 PROCEDURE — 99204 OFFICE O/P NEW MOD 45 MIN: CPT | Performed by: INTERNAL MEDICINE

## 2019-04-08 ASSESSMENT — PATIENT HEALTH QUESTIONNAIRE - PHQ9
2. FEELING DOWN, DEPRESSED OR HOPELESS: 0
SUM OF ALL RESPONSES TO PHQ QUESTIONS 1-9: 0
1. LITTLE INTEREST OR PLEASURE IN DOING THINGS: 0
SUM OF ALL RESPONSES TO PHQ9 QUESTIONS 1 & 2: 0
SUM OF ALL RESPONSES TO PHQ QUESTIONS 1-9: 0

## 2019-04-08 NOTE — PROGRESS NOTES
2019    Kb Andrea (:  1945) is a 69 y.o. male, here for evaluation of the following medical concerns:    HPI Patient presents today to establish care. He reports he is doing well overall. His main issues are a fib, for which he follows with cardiology, and significant GERD. He is frustrated that the ablation did not resolve his a fib issues, and he is not thrilled about being on medications for the rest of his life, nor is he excited about having another procedure that may not resolve his a-fib, which is symptomatic. He also reports significant GERD and is wondering if this is contributing to the a-fib. He does not want to have surgery, but again is not excited about lifelong medications. Review of Systems   Constitutional: Negative for activity change, appetite change, chills, fatigue and fever. HENT: Negative for congestion and sinus pressure. Respiratory: Negative for cough, chest tightness and shortness of breath. Cardiovascular: Positive for palpitations. Negative for chest pain and leg swelling. Gastrointestinal: Positive for abdominal pain. Negative for constipation, diarrhea, nausea and vomiting. Genitourinary: Negative for difficulty urinating. Musculoskeletal: Negative for arthralgias. Skin: Negative for rash. Neurological: Negative for headaches. Current Outpatient Medications on File Prior to Visit   Medication Sig Dispense Refill    diltiazem (CARDIZEM) 30 MG tablet Take 1 tablet by mouth as needed for rapid heart rate or A fib symptoms.   Do not take more than 2 per day 60 tablet 0    metoprolol succinate (TOPROL XL) 25 MG extended release tablet Take 1 tablet by mouth daily 30 tablet 3    atorvastatin (LIPITOR) 40 MG tablet TAKE 1 TABLET BY MOUTH DAILY 90 tablet 3    apixaban (ELIQUIS) 5 MG TABS tablet Take 1 tablet by mouth 2 times daily 60 tablet 6    RaNITidine HCl (ZANTAC 150 MAXIMUM STRENGTH PO) Take by mouth      calcium carbonate (TUMS) 500 MG chewable tablet Take 1 tablet by mouth daily      aspirin EC 81 MG EC tablet Take 1 tablet by mouth daily. 30 tablet 11     No current facility-administered medications on file prior to visit. No Known Allergies    Past Medical History:   Diagnosis Date    Atrial fibrillation (HCC)     DOES NOT HAVE HYPERTENSION, PER PT       Past Surgical History:   Procedure Laterality Date    CHOLECYSTECTOMY, LAPAROSCOPIC  1/13/16    LAPAROSCOPIC CHOLECYSTECTOMY WITH CHOLANGIOGRAM    [facility]    CORONARY ANGIOPLASTY WITH STENT PLACEMENT  2008    TONSILLECTOMY  1955    TOOTH EXTRACTION  2013    [facility]       Social History     Socioeconomic History    Marital status:      Spouse name: Katrina Fung Number of children: 2    Years of education: Not on file    Highest education level: Not on file   Occupational History    Occupation: retired    Social Needs    Financial resource strain: Not on file    Food insecurity:     Worry: Not on file     Inability: Not on file   ShotSpotter needs:     Medical: Not on file     Non-medical: Not on file   Tobacco Use    Smoking status: Current Every Day Smoker     Packs/day: 0.50     Years: 45.00     Pack years: 22.50     Types: Cigarettes    Smokeless tobacco: Never Used   Substance and Sexual Activity    Alcohol use:  Yes     Alcohol/week: 6.0 oz     Types: 10 Cans of beer per week    Drug use: No    Sexual activity: Not on file   Lifestyle    Physical activity:     Days per week: Not on file     Minutes per session: Not on file    Stress: Not on file   Relationships    Social connections:     Talks on phone: Not on file     Gets together: Not on file     Attends [employer] service: Not on file     Active member of club or organization: Not on file     Attends meetings of clubs or organizations: Not on file     Relationship status: Not on file    Intimate partner violence:     Fear of current or ex partner: Not on with esophagitis  - Discussed options for management, and overall safety of H2 blockers vs PPIs, and possible surgical interventions. Given current blood thinner use, I do not think surgery is the safest option for him at this time so we will continue ranitidine until cardiac issues are more stabilized. Hyperlipidemia, unspecified hyperlipidemia type  - Cont statin    Essential hypertension  - At goal, cont current regimen    Coronary artery disease involving native heart without angina pectoris, unspecified vessel or lesion type  - Cont statin    Atrial fibrillation, persistent (HCC)  - Pt is very interested in MAZE procedure, but would like to have a 2nd opinion before any further interventions. Cont current regimen of BB, eliquis. dilt for now    Current Outpatient Medications   Medication Sig Dispense Refill    diltiazem (CARDIZEM) 30 MG tablet Take 1 tablet by mouth as needed for rapid heart rate or A fib symptoms. Do not take more than 2 per day 60 tablet 0    metoprolol succinate (TOPROL XL) 25 MG extended release tablet Take 1 tablet by mouth daily 30 tablet 3    atorvastatin (LIPITOR) 40 MG tablet TAKE 1 TABLET BY MOUTH DAILY 90 tablet 3    apixaban (ELIQUIS) 5 MG TABS tablet Take 1 tablet by mouth 2 times daily 60 tablet 6    RaNITidine HCl (ZANTAC 150 MAXIMUM STRENGTH PO) Take by mouth      calcium carbonate (TUMS) 500 MG chewable tablet Take 1 tablet by mouth daily      aspirin EC 81 MG EC tablet Take 1 tablet by mouth daily. 30 tablet 11     No current facility-administered medications for this visit. Health Maintenance Due   Topic Date Due    AAA screen  1945    Hepatitis C screen  1945    DTaP/Tdap/Td vaccine (1 - Tdap) 02/08/1966    Shingles Vaccine (1 of 2) 02/08/1997    Colon cancer screen colonoscopy  02/08/1997    Pneumococcal 65+ years Vaccine (1 of 2 - PCV13) 02/08/2012     Return in about 3 months (around 7/8/2019).     An  electronic signature was used to authenticate this note.     -- Nadya Brown MD on 4/19/2019 at 10:44 AM

## 2019-04-19 PROBLEM — I48.19 ATRIAL FIBRILLATION, PERSISTENT (HCC): Status: ACTIVE | Noted: 2018-12-17

## 2019-04-19 PROBLEM — Z85.048 PERSONAL HISTORY OF RECTAL CANCER: Status: ACTIVE | Noted: 2017-05-08

## 2019-04-19 PROBLEM — Z90.49 STATUS POST PARTIAL COLECTOMY: Status: ACTIVE | Noted: 2017-05-19

## 2019-04-19 PROBLEM — K22.70 BARRETT'S ESOPHAGUS: Status: ACTIVE | Noted: 2019-04-19

## 2019-04-19 PROBLEM — I25.10 CAD IN NATIVE ARTERY: Status: ACTIVE | Noted: 2017-05-08

## 2019-04-19 PROBLEM — K21.00 GASTROESOPHAGEAL REFLUX DISEASE WITH ESOPHAGITIS: Status: ACTIVE | Noted: 2017-06-28

## 2019-04-19 ASSESSMENT — ENCOUNTER SYMPTOMS
NAUSEA: 0
CONSTIPATION: 0
COUGH: 0
ABDOMINAL PAIN: 1
DIARRHEA: 0
SHORTNESS OF BREATH: 0
CHEST TIGHTNESS: 0
SINUS PRESSURE: 0
VOMITING: 0

## 2019-07-12 ENCOUNTER — HOSPITAL ENCOUNTER (OUTPATIENT)
Dept: CARDIAC CATH/INVASIVE PROCEDURES | Age: 74
Discharge: HOME | End: 2019-07-13
Attending: INTERNAL MEDICINE | Admitting: INTERNAL MEDICINE
Payer: MEDICARE

## 2019-07-12 ENCOUNTER — ANESTHESIA EVENT (OUTPATIENT)
Dept: CARDIAC CATH/INVASIVE PROCEDURES | Age: 74
End: 2019-07-12
Payer: MEDICARE

## 2019-07-12 ENCOUNTER — ANESTHESIA (OUTPATIENT)
Dept: CARDIAC CATH/INVASIVE PROCEDURES | Age: 74
End: 2019-07-12
Payer: MEDICARE

## 2019-07-12 VITALS
OXYGEN SATURATION: 100 % | DIASTOLIC BLOOD PRESSURE: 70 MMHG | SYSTOLIC BLOOD PRESSURE: 122 MMHG | RESPIRATION RATE: 7 BRPM | TEMPERATURE: 96.3 F

## 2019-07-12 LAB
A/G RATIO: 1.3 (ref 1.1–2.2)
ABO/RH: NORMAL
ACTIVATED CLOTTING TIME: 328 SEC (ref 99–130)
ACTIVATED CLOTTING TIME: 351 SEC (ref 99–130)
ACTIVATED CLOTTING TIME: 355 SEC (ref 99–130)
ACTIVATED CLOTTING TIME: 357 SEC (ref 99–130)
ACTIVATED CLOTTING TIME: 366 SEC (ref 99–130)
ACTIVATED CLOTTING TIME: 383 SEC (ref 99–130)
ACTIVATED CLOTTING TIME: 403 SEC (ref 99–130)
ALBUMIN SERPL-MCNC: 4 G/DL (ref 3.4–5)
ALP BLD-CCNC: 78 U/L (ref 40–129)
ALT SERPL-CCNC: 16 U/L (ref 10–40)
ANION GAP SERPL CALCULATED.3IONS-SCNC: 9 MMOL/L (ref 3–16)
ANTIBODY SCREEN: NORMAL
AST SERPL-CCNC: 15 U/L (ref 15–37)
BILIRUB SERPL-MCNC: 0.3 MG/DL (ref 0–1)
BUN BLDV-MCNC: 19 MG/DL (ref 7–20)
CALCIUM SERPL-MCNC: 9.8 MG/DL (ref 8.3–10.6)
CHLORIDE BLD-SCNC: 105 MMOL/L (ref 99–110)
CO2: 24 MMOL/L (ref 21–32)
CREAT SERPL-MCNC: 1 MG/DL (ref 0.8–1.3)
EKG ATRIAL RATE: 45 BPM
EKG DIAGNOSIS: NORMAL
EKG P AXIS: 44 DEGREES
EKG P-R INTERVAL: 172 MS
EKG Q-T INTERVAL: 422 MS
EKG QRS DURATION: 90 MS
EKG QTC CALCULATION (BAZETT): 365 MS
EKG R AXIS: 14 DEGREES
EKG T AXIS: 48 DEGREES
EKG VENTRICULAR RATE: 45 BPM
GFR AFRICAN AMERICAN: >60
GFR NON-AFRICAN AMERICAN: >60
GLOBULIN: 3.2 G/DL
GLUCOSE BLD-MCNC: 105 MG/DL (ref 70–99)
HCT VFR BLD CALC: 41.7 % (ref 40.5–52.5)
HEMOGLOBIN: 14.2 G/DL (ref 13.5–17.5)
INR BLD: 1.06 (ref 0.86–1.14)
MAGNESIUM: 2.2 MG/DL (ref 1.8–2.4)
MCH RBC QN AUTO: 29.7 PG (ref 26–34)
MCHC RBC AUTO-ENTMCNC: 34.2 G/DL (ref 31–36)
MCV RBC AUTO: 86.8 FL (ref 80–100)
PDW BLD-RTO: 15 % (ref 12.4–15.4)
PLATELET # BLD: 200 K/UL (ref 135–450)
PMV BLD AUTO: 8.5 FL (ref 5–10.5)
POTASSIUM SERPL-SCNC: 4.1 MMOL/L (ref 3.5–5.1)
PROTHROMBIN TIME: 12.1 SEC (ref 9.8–13)
RBC # BLD: 4.8 M/UL (ref 4.2–5.9)
SODIUM BLD-SCNC: 138 MMOL/L (ref 136–145)
TOTAL PROTEIN: 7.2 G/DL (ref 6.4–8.2)
WBC # BLD: 7.8 K/UL (ref 4–11)

## 2019-07-12 PROCEDURE — 93623 PRGRMD STIMJ&PACG IV RX NFS: CPT | Performed by: INTERNAL MEDICINE

## 2019-07-12 PROCEDURE — C1730 CATH, EP, 19 OR FEW ELECT: HCPCS

## 2019-07-12 PROCEDURE — 86900 BLOOD TYPING SEROLOGIC ABO: CPT

## 2019-07-12 PROCEDURE — 7100000001 HC PACU RECOVERY - ADDTL 15 MIN

## 2019-07-12 PROCEDURE — 93662 INTRACARDIAC ECG (ICE): CPT

## 2019-07-12 PROCEDURE — 83735 ASSAY OF MAGNESIUM: CPT

## 2019-07-12 PROCEDURE — 2580000003 HC RX 258: Performed by: NURSE ANESTHETIST, CERTIFIED REGISTERED

## 2019-07-12 PROCEDURE — 36415 COLL VENOUS BLD VENIPUNCTURE: CPT

## 2019-07-12 PROCEDURE — 2709999900 HC NON-CHARGEABLE SUPPLY

## 2019-07-12 PROCEDURE — 2720000010 HC SURG SUPPLY STERILE

## 2019-07-12 PROCEDURE — 93613 INTRACARDIAC EPHYS 3D MAPG: CPT

## 2019-07-12 PROCEDURE — C1759 CATH, INTRA ECHOCARDIOGRAPHY: HCPCS

## 2019-07-12 PROCEDURE — 6360000002 HC RX W HCPCS: Performed by: NURSE ANESTHETIST, CERTIFIED REGISTERED

## 2019-07-12 PROCEDURE — 93662 INTRACARDIAC ECG (ICE): CPT | Performed by: INTERNAL MEDICINE

## 2019-07-12 PROCEDURE — 7100000000 HC PACU RECOVERY - FIRST 15 MIN

## 2019-07-12 PROCEDURE — 93655 ICAR CATH ABLTJ DSCRT ARRHYT: CPT

## 2019-07-12 PROCEDURE — 80053 COMPREHEN METABOLIC PANEL: CPT

## 2019-07-12 PROCEDURE — 93657 TX L/R ATRIAL FIB ADDL: CPT

## 2019-07-12 PROCEDURE — 86850 RBC ANTIBODY SCREEN: CPT

## 2019-07-12 PROCEDURE — 93010 ELECTROCARDIOGRAM REPORT: CPT | Performed by: INTERNAL MEDICINE

## 2019-07-12 PROCEDURE — 3700000000 HC ANESTHESIA ATTENDED CARE

## 2019-07-12 PROCEDURE — 93623 PRGRMD STIMJ&PACG IV RX NFS: CPT

## 2019-07-12 PROCEDURE — 3700000001 HC ADD 15 MINUTES (ANESTHESIA)

## 2019-07-12 PROCEDURE — 93613 INTRACARDIAC EPHYS 3D MAPG: CPT | Performed by: INTERNAL MEDICINE

## 2019-07-12 PROCEDURE — C1732 CATH, EP, DIAG/ABL, 3D/VECT: HCPCS

## 2019-07-12 PROCEDURE — 2500000003 HC RX 250 WO HCPCS: Performed by: NURSE ANESTHETIST, CERTIFIED REGISTERED

## 2019-07-12 PROCEDURE — 2580000003 HC RX 258

## 2019-07-12 PROCEDURE — C1894 INTRO/SHEATH, NON-LASER: HCPCS

## 2019-07-12 PROCEDURE — 93656 COMPRE EP EVAL ABLTJ ATR FIB: CPT

## 2019-07-12 PROCEDURE — 6360000002 HC RX W HCPCS

## 2019-07-12 PROCEDURE — 86901 BLOOD TYPING SEROLOGIC RH(D): CPT

## 2019-07-12 PROCEDURE — 2500000003 HC RX 250 WO HCPCS

## 2019-07-12 PROCEDURE — 85027 COMPLETE CBC AUTOMATED: CPT

## 2019-07-12 PROCEDURE — 85610 PROTHROMBIN TIME: CPT

## 2019-07-12 PROCEDURE — 93655 ICAR CATH ABLTJ DSCRT ARRHYT: CPT | Performed by: INTERNAL MEDICINE

## 2019-07-12 PROCEDURE — 6370000000 HC RX 637 (ALT 250 FOR IP): Performed by: INTERNAL MEDICINE

## 2019-07-12 PROCEDURE — 93656 COMPRE EP EVAL ABLTJ ATR FIB: CPT | Performed by: INTERNAL MEDICINE

## 2019-07-12 PROCEDURE — 93005 ELECTROCARDIOGRAM TRACING: CPT | Performed by: INTERNAL MEDICINE

## 2019-07-12 PROCEDURE — 85347 COAGULATION TIME ACTIVATED: CPT

## 2019-07-12 RX ORDER — ATORVASTATIN CALCIUM 40 MG/1
40 TABLET, FILM COATED ORAL DAILY
Status: DISCONTINUED | OUTPATIENT
Start: 2019-07-12 | End: 2019-07-13 | Stop reason: HOSPADM

## 2019-07-12 RX ORDER — SUCCINYLCHOLINE CHLORIDE 20 MG/ML
INJECTION INTRAMUSCULAR; INTRAVENOUS PRN
Status: DISCONTINUED | OUTPATIENT
Start: 2019-07-12 | End: 2019-07-12 | Stop reason: SDUPTHER

## 2019-07-12 RX ORDER — ONDANSETRON 2 MG/ML
INJECTION INTRAMUSCULAR; INTRAVENOUS PRN
Status: DISCONTINUED | OUTPATIENT
Start: 2019-07-12 | End: 2019-07-12 | Stop reason: SDUPTHER

## 2019-07-12 RX ORDER — GLYCOPYRROLATE 0.2 MG/ML
INJECTION INTRAMUSCULAR; INTRAVENOUS PRN
Status: DISCONTINUED | OUTPATIENT
Start: 2019-07-12 | End: 2019-07-12 | Stop reason: SDUPTHER

## 2019-07-12 RX ORDER — FENTANYL CITRATE 50 UG/ML
INJECTION, SOLUTION INTRAMUSCULAR; INTRAVENOUS PRN
Status: DISCONTINUED | OUTPATIENT
Start: 2019-07-12 | End: 2019-07-12 | Stop reason: SDUPTHER

## 2019-07-12 RX ORDER — DIPHENHYDRAMINE HYDROCHLORIDE 50 MG/ML
12.5 INJECTION INTRAMUSCULAR; INTRAVENOUS
Status: DISCONTINUED | OUTPATIENT
Start: 2019-07-12 | End: 2019-07-12

## 2019-07-12 RX ORDER — FENTANYL CITRATE 50 UG/ML
50 INJECTION, SOLUTION INTRAMUSCULAR; INTRAVENOUS EVERY 5 MIN PRN
Status: DISCONTINUED | OUTPATIENT
Start: 2019-07-12 | End: 2019-07-12

## 2019-07-12 RX ORDER — METOPROLOL SUCCINATE 25 MG/1
25 TABLET, EXTENDED RELEASE ORAL DAILY
Status: DISCONTINUED | OUTPATIENT
Start: 2019-07-12 | End: 2019-07-13 | Stop reason: HOSPADM

## 2019-07-12 RX ORDER — CALCIUM CARBONATE 200(500)MG
1 TABLET,CHEWABLE ORAL DAILY
Status: DISCONTINUED | OUTPATIENT
Start: 2019-07-12 | End: 2019-07-13 | Stop reason: HOSPADM

## 2019-07-12 RX ORDER — PROTAMINE SULFATE 10 MG/ML
INJECTION, SOLUTION INTRAVENOUS PRN
Status: DISCONTINUED | OUTPATIENT
Start: 2019-07-12 | End: 2019-07-12 | Stop reason: SDUPTHER

## 2019-07-12 RX ORDER — PANTOPRAZOLE SODIUM 40 MG/1
40 TABLET, DELAYED RELEASE ORAL DAILY
Status: DISCONTINUED | OUTPATIENT
Start: 2019-07-12 | End: 2019-07-13 | Stop reason: HOSPADM

## 2019-07-12 RX ORDER — ASPIRIN 81 MG/1
81 TABLET ORAL DAILY
Status: DISCONTINUED | OUTPATIENT
Start: 2019-07-12 | End: 2019-07-13 | Stop reason: HOSPADM

## 2019-07-12 RX ORDER — SODIUM CHLORIDE, SODIUM LACTATE, POTASSIUM CHLORIDE, CALCIUM CHLORIDE 600; 310; 30; 20 MG/100ML; MG/100ML; MG/100ML; MG/100ML
INJECTION, SOLUTION INTRAVENOUS CONTINUOUS PRN
Status: DISCONTINUED | OUTPATIENT
Start: 2019-07-12 | End: 2019-07-12 | Stop reason: SDUPTHER

## 2019-07-12 RX ORDER — PROPOFOL 10 MG/ML
INJECTION, EMULSION INTRAVENOUS PRN
Status: DISCONTINUED | OUTPATIENT
Start: 2019-07-12 | End: 2019-07-12 | Stop reason: SDUPTHER

## 2019-07-12 RX ORDER — HYDROCODONE BITARTRATE AND ACETAMINOPHEN 5; 325 MG/1; MG/1
2 TABLET ORAL PRN
Status: DISCONTINUED | OUTPATIENT
Start: 2019-07-12 | End: 2019-07-12

## 2019-07-12 RX ORDER — HYDROMORPHONE HCL 110MG/55ML
0.5 PATIENT CONTROLLED ANALGESIA SYRINGE INTRAVENOUS EVERY 5 MIN PRN
Status: DISCONTINUED | OUTPATIENT
Start: 2019-07-12 | End: 2019-07-12

## 2019-07-12 RX ORDER — HYDROMORPHONE HCL 110MG/55ML
0.25 PATIENT CONTROLLED ANALGESIA SYRINGE INTRAVENOUS EVERY 5 MIN PRN
Status: DISCONTINUED | OUTPATIENT
Start: 2019-07-12 | End: 2019-07-12

## 2019-07-12 RX ORDER — SODIUM CHLORIDE 9 MG/ML
INJECTION, SOLUTION INTRAVENOUS CONTINUOUS PRN
Status: DISCONTINUED | OUTPATIENT
Start: 2019-07-12 | End: 2019-07-12 | Stop reason: SDUPTHER

## 2019-07-12 RX ORDER — SODIUM CHLORIDE 0.9 % (FLUSH) 0.9 %
10 SYRINGE (ML) INJECTION PRN
Status: DISCONTINUED | OUTPATIENT
Start: 2019-07-12 | End: 2019-07-13 | Stop reason: HOSPADM

## 2019-07-12 RX ORDER — HYDROCODONE BITARTRATE AND ACETAMINOPHEN 5; 325 MG/1; MG/1
1 TABLET ORAL PRN
Status: DISCONTINUED | OUTPATIENT
Start: 2019-07-12 | End: 2019-07-12

## 2019-07-12 RX ORDER — DEXAMETHASONE SODIUM PHOSPHATE 4 MG/ML
INJECTION, SOLUTION INTRA-ARTICULAR; INTRALESIONAL; INTRAMUSCULAR; INTRAVENOUS; SOFT TISSUE PRN
Status: DISCONTINUED | OUTPATIENT
Start: 2019-07-12 | End: 2019-07-12 | Stop reason: SDUPTHER

## 2019-07-12 RX ORDER — FUROSEMIDE 10 MG/ML
INJECTION INTRAMUSCULAR; INTRAVENOUS PRN
Status: DISCONTINUED | OUTPATIENT
Start: 2019-07-12 | End: 2019-07-12 | Stop reason: SDUPTHER

## 2019-07-12 RX ORDER — HEPARIN SODIUM 1000 [USP'U]/ML
INJECTION, SOLUTION INTRAVENOUS; SUBCUTANEOUS PRN
Status: DISCONTINUED | OUTPATIENT
Start: 2019-07-12 | End: 2019-07-12 | Stop reason: SDUPTHER

## 2019-07-12 RX ORDER — SODIUM CHLORIDE 0.9 % (FLUSH) 0.9 %
10 SYRINGE (ML) INJECTION EVERY 12 HOURS SCHEDULED
Status: DISCONTINUED | OUTPATIENT
Start: 2019-07-12 | End: 2019-07-13 | Stop reason: HOSPADM

## 2019-07-12 RX ORDER — MIDAZOLAM HYDROCHLORIDE 1 MG/ML
INJECTION INTRAMUSCULAR; INTRAVENOUS PRN
Status: DISCONTINUED | OUTPATIENT
Start: 2019-07-12 | End: 2019-07-12 | Stop reason: SDUPTHER

## 2019-07-12 RX ORDER — PROMETHAZINE HYDROCHLORIDE 25 MG/ML
6.25 INJECTION, SOLUTION INTRAMUSCULAR; INTRAVENOUS EVERY 30 MIN PRN
Status: DISCONTINUED | OUTPATIENT
Start: 2019-07-12 | End: 2019-07-12

## 2019-07-12 RX ORDER — FENTANYL CITRATE 50 UG/ML
25 INJECTION, SOLUTION INTRAMUSCULAR; INTRAVENOUS EVERY 5 MIN PRN
Status: DISCONTINUED | OUTPATIENT
Start: 2019-07-12 | End: 2019-07-12

## 2019-07-12 RX ORDER — ACETAMINOPHEN 325 MG/1
650 TABLET ORAL EVERY 4 HOURS PRN
Status: DISCONTINUED | OUTPATIENT
Start: 2019-07-12 | End: 2019-07-13 | Stop reason: HOSPADM

## 2019-07-12 RX ORDER — MEPERIDINE HYDROCHLORIDE 25 MG/ML
12.5 INJECTION INTRAMUSCULAR; INTRAVENOUS; SUBCUTANEOUS EVERY 5 MIN PRN
Status: DISCONTINUED | OUTPATIENT
Start: 2019-07-12 | End: 2019-07-12

## 2019-07-12 RX ADMIN — PROPOFOL 50 MG: 10 INJECTION, EMULSION INTRAVENOUS at 07:37

## 2019-07-12 RX ADMIN — APIXABAN 5 MG: 5 TABLET, FILM COATED ORAL at 23:30

## 2019-07-12 RX ADMIN — GLYCOPYRROLATE 0.2 MG: 0.2 INJECTION INTRAMUSCULAR; INTRAVENOUS at 07:41

## 2019-07-12 RX ADMIN — MIDAZOLAM HYDROCHLORIDE 2 MG: 1 INJECTION, SOLUTION INTRAMUSCULAR; INTRAVENOUS at 07:33

## 2019-07-12 RX ADMIN — GLYCOPYRROLATE 0.1 MG: 0.2 INJECTION INTRAMUSCULAR; INTRAVENOUS at 07:56

## 2019-07-12 RX ADMIN — PROPOFOL 50 MG: 10 INJECTION, EMULSION INTRAVENOUS at 07:45

## 2019-07-12 RX ADMIN — SUCCINYLCHOLINE CHLORIDE 120 MG: 20 INJECTION, SOLUTION INTRAMUSCULAR; INTRAVENOUS at 07:37

## 2019-07-12 RX ADMIN — PHENYLEPHRINE HYDROCHLORIDE 50 MCG/MIN: 10 INJECTION INTRAVENOUS at 07:54

## 2019-07-12 RX ADMIN — SODIUM CHLORIDE: 9 INJECTION, SOLUTION INTRAVENOUS at 07:45

## 2019-07-12 RX ADMIN — HEPARIN SODIUM 3000 UNITS: 1000 INJECTION, SOLUTION INTRAVENOUS; SUBCUTANEOUS at 09:42

## 2019-07-12 RX ADMIN — ONDANSETRON 4 MG: 2 INJECTION INTRAMUSCULAR; INTRAVENOUS at 10:48

## 2019-07-12 RX ADMIN — PROPOFOL 50 MG: 10 INJECTION, EMULSION INTRAVENOUS at 08:05

## 2019-07-12 RX ADMIN — ISOPROTERENOL HYDROCHLORIDE 10 MCG/MIN: 0.2 INJECTION, SOLUTION INTRAMUSCULAR; INTRAVENOUS at 10:19

## 2019-07-12 RX ADMIN — DEXAMETHASONE SODIUM PHOSPHATE 8 MG: 4 INJECTION, SOLUTION INTRA-ARTICULAR; INTRALESIONAL; INTRAMUSCULAR; INTRAVENOUS; SOFT TISSUE at 07:37

## 2019-07-12 RX ADMIN — ASPIRIN 81 MG: 81 TABLET ORAL at 13:09

## 2019-07-12 RX ADMIN — APIXABAN 5 MG: 5 TABLET, FILM COATED ORAL at 13:09

## 2019-07-12 RX ADMIN — PANTOPRAZOLE SODIUM 40 MG: 40 TABLET, DELAYED RELEASE ORAL at 13:09

## 2019-07-12 RX ADMIN — HEPARIN SODIUM 3000 UNITS: 1000 INJECTION, SOLUTION INTRAVENOUS; SUBCUTANEOUS at 08:25

## 2019-07-12 RX ADMIN — DESMOPRESSIN ACETATE 40 MG: 0.2 TABLET ORAL at 13:09

## 2019-07-12 RX ADMIN — SODIUM CHLORIDE: 9 INJECTION, SOLUTION INTRAVENOUS at 07:12

## 2019-07-12 RX ADMIN — FENTANYL CITRATE 50 MCG: 50 INJECTION INTRAMUSCULAR; INTRAVENOUS at 07:33

## 2019-07-12 RX ADMIN — FENTANYL CITRATE 50 MCG: 50 INJECTION INTRAMUSCULAR; INTRAVENOUS at 08:03

## 2019-07-12 RX ADMIN — PROPOFOL 150 MG: 10 INJECTION, EMULSION INTRAVENOUS at 07:36

## 2019-07-12 RX ADMIN — FUROSEMIDE 20 MG: 10 INJECTION, SOLUTION INTRAMUSCULAR; INTRAVENOUS at 10:42

## 2019-07-12 RX ADMIN — SODIUM CHLORIDE, POTASSIUM CHLORIDE, SODIUM LACTATE AND CALCIUM CHLORIDE: 600; 310; 30; 20 INJECTION, SOLUTION INTRAVENOUS at 10:38

## 2019-07-12 RX ADMIN — HEPARIN SODIUM 3000 UNITS: 1000 INJECTION, SOLUTION INTRAVENOUS; SUBCUTANEOUS at 08:42

## 2019-07-12 RX ADMIN — HEPARIN SODIUM 15000 UNITS: 1000 INJECTION, SOLUTION INTRAVENOUS; SUBCUTANEOUS at 08:08

## 2019-07-12 RX ADMIN — PROTAMINE SULFATE 20 MG: 10 INJECTION, SOLUTION INTRAVENOUS at 10:42

## 2019-07-12 RX ADMIN — HEPARIN SODIUM 3000 UNITS: 1000 INJECTION, SOLUTION INTRAVENOUS; SUBCUTANEOUS at 09:24

## 2019-07-12 ASSESSMENT — PULMONARY FUNCTION TESTS
PIF_VALUE: 21
PIF_VALUE: 21
PIF_VALUE: 1
PIF_VALUE: 20
PIF_VALUE: 19
PIF_VALUE: 22
PIF_VALUE: 20
PIF_VALUE: 18
PIF_VALUE: 1
PIF_VALUE: 0
PIF_VALUE: 20
PIF_VALUE: 1
PIF_VALUE: 19
PIF_VALUE: 1
PIF_VALUE: 17
PIF_VALUE: 20
PIF_VALUE: 19
PIF_VALUE: 20
PIF_VALUE: 20
PIF_VALUE: 19
PIF_VALUE: 22
PIF_VALUE: 20
PIF_VALUE: 23
PIF_VALUE: 22
PIF_VALUE: 20
PIF_VALUE: 15
PIF_VALUE: 18
PIF_VALUE: 20
PIF_VALUE: 19
PIF_VALUE: 19
PIF_VALUE: 22
PIF_VALUE: 20
PIF_VALUE: 20
PIF_VALUE: 19
PIF_VALUE: 20
PIF_VALUE: 21
PIF_VALUE: 20
PIF_VALUE: 20
PIF_VALUE: 19
PIF_VALUE: 20
PIF_VALUE: 19
PIF_VALUE: 18
PIF_VALUE: 20
PIF_VALUE: 21
PIF_VALUE: 19
PIF_VALUE: 19
PIF_VALUE: 20
PIF_VALUE: 19
PIF_VALUE: 23
PIF_VALUE: 18
PIF_VALUE: 19
PIF_VALUE: 20
PIF_VALUE: 21
PIF_VALUE: 20
PIF_VALUE: 1
PIF_VALUE: 20
PIF_VALUE: 22
PIF_VALUE: 20
PIF_VALUE: 19
PIF_VALUE: 20
PIF_VALUE: 0
PIF_VALUE: 22
PIF_VALUE: 20
PIF_VALUE: 17
PIF_VALUE: 21
PIF_VALUE: 3
PIF_VALUE: 18
PIF_VALUE: 0
PIF_VALUE: 1
PIF_VALUE: 6
PIF_VALUE: 20
PIF_VALUE: 20
PIF_VALUE: 21
PIF_VALUE: 22
PIF_VALUE: 20
PIF_VALUE: 20
PIF_VALUE: 19
PIF_VALUE: 20
PIF_VALUE: 20
PIF_VALUE: 22
PIF_VALUE: 18
PIF_VALUE: 20
PIF_VALUE: 20
PIF_VALUE: 21
PIF_VALUE: 20
PIF_VALUE: 20
PIF_VALUE: 21
PIF_VALUE: 19
PIF_VALUE: 17
PIF_VALUE: 21
PIF_VALUE: 20
PIF_VALUE: 21
PIF_VALUE: 20
PIF_VALUE: 22
PIF_VALUE: 20
PIF_VALUE: 19
PIF_VALUE: 20
PIF_VALUE: 20
PIF_VALUE: 22
PIF_VALUE: 19
PIF_VALUE: 20
PIF_VALUE: 24
PIF_VALUE: 21
PIF_VALUE: 21
PIF_VALUE: 19
PIF_VALUE: 19
PIF_VALUE: 20
PIF_VALUE: 16
PIF_VALUE: 20
PIF_VALUE: 13
PIF_VALUE: 0
PIF_VALUE: 20
PIF_VALUE: 22
PIF_VALUE: 19
PIF_VALUE: 22
PIF_VALUE: 20
PIF_VALUE: 20
PIF_VALUE: 1
PIF_VALUE: 22
PIF_VALUE: 20
PIF_VALUE: 19
PIF_VALUE: 22
PIF_VALUE: 20
PIF_VALUE: 18
PIF_VALUE: 21
PIF_VALUE: 21
PIF_VALUE: 19
PIF_VALUE: 21
PIF_VALUE: 21
PIF_VALUE: 19
PIF_VALUE: 18
PIF_VALUE: 22
PIF_VALUE: 20
PIF_VALUE: 20
PIF_VALUE: 21
PIF_VALUE: 20
PIF_VALUE: 19
PIF_VALUE: 21
PIF_VALUE: 16
PIF_VALUE: 19
PIF_VALUE: 22
PIF_VALUE: 20
PIF_VALUE: 20
PIF_VALUE: 19
PIF_VALUE: 20
PIF_VALUE: 20
PIF_VALUE: 19
PIF_VALUE: 23
PIF_VALUE: 20
PIF_VALUE: 20
PIF_VALUE: 19
PIF_VALUE: 20
PIF_VALUE: 19
PIF_VALUE: 21
PIF_VALUE: 16
PIF_VALUE: 20
PIF_VALUE: 19
PIF_VALUE: 20
PIF_VALUE: 4
PIF_VALUE: 20
PIF_VALUE: 21
PIF_VALUE: 23
PIF_VALUE: 15
PIF_VALUE: 21
PIF_VALUE: 1
PIF_VALUE: 21
PIF_VALUE: 21
PIF_VALUE: 20
PIF_VALUE: 21
PIF_VALUE: 19
PIF_VALUE: 3
PIF_VALUE: 19
PIF_VALUE: 20
PIF_VALUE: 21
PIF_VALUE: 21
PIF_VALUE: 22
PIF_VALUE: 19
PIF_VALUE: 19
PIF_VALUE: 20
PIF_VALUE: 19
PIF_VALUE: 20
PIF_VALUE: 21
PIF_VALUE: 21
PIF_VALUE: 20
PIF_VALUE: 19
PIF_VALUE: 19
PIF_VALUE: 20

## 2019-07-12 ASSESSMENT — PAIN SCALES - GENERAL
PAINLEVEL_OUTOF10: 0

## 2019-07-12 NOTE — PROCEDURES
using our CS catheter and ablation cathter to assess for other arrhythmias. The deca polar/ablation catheter were moved from CS to right atrium, RV apex, and His bundle position. His bundle potentials was recorded and pacing was performed from right atrium, coronary sinus and RV apex with the following results:     AH interval was 57 msec  HV interval was 50 msec  Pacing from atrium, using CS catheter which was moved, showed 1:1 conduction over AV node with (AV wenckebach) was 350 msec  Pacing from atrium, AV cathie ERP was 500/280 msec   AHJ at 500/330  No inducible AVNRT    Pacing from RV apex, using CS cathter which was moved to the RV apex showed 1:1 conduction over AV node (VA wenckebach) was 450 mec  Pacing from RV apex, showed VERP of 600/300 msec. At the end of the procedure, using ICE we confirmed the lack of any pericardial effusion. Figure of 8 suture was used and sheaths were removed using manual compression. 20 lasix  20 protamine    The patient tolerated the procedure well and there were no complications. Patient was extubated and transferred to the floor in stable condition. Conclusion:     - Pulmonary vein isolations using wide area circumferential radiofrequency ablation   ·  - Additional ablation of arrhythmogenic focus      Plan:   The patient will be admitted overnight to CVU. He will receive usual post ablation care.      Criss Black MD,   [facility]   Office: [phone]

## 2019-07-12 NOTE — ANESTHESIA PRE PROCEDURE
 Atrial fibrillation, persistent (HCC) I48.1       Past Medical History:        Diagnosis Date    Atrial fibrillation ([address]     DOES NOT HAVE HYPERTENSION, PER PT       Past Surgical History:        Procedure Laterality Date    CHOLECYSTECTOMY, LAPAROSCOPIC  1/13/16    LAPAROSCOPIC CHOLECYSTECTOMY WITH CHOLANGIOGRAM    [facility]08/2017    [facility]    CORONARY ANGIOPLASTY WITH STENT PLACEMENT  2008    TONSILLECTOMY  1955    TOOTH EXTRACTION  2013    [facility]       Social History:    Social History     Tobacco Use    Smoking status: Current Every Day Smoker     Packs/day: 0.50     Years: 45.00     Pack years: 22.50     Types: Cigarettes    Smokeless tobacco: Never Used   Substance Use Topics    Alcohol use: Yes     Alcohol/week: 6.0 oz     Types: 10 Cans of beer per week                                Ready to quit: Not Answered  Counseling given: Not Answered      Vital Signs (Current): There were no vitals filed for this visit.                                            BP Readings from Last 3 Encounters:   04/08/19 110/62   03/27/19 108/64   03/20/19 116/73       NPO Status:                                                                                 BMI:   Wt Readings from Last 3 Encounters:   04/08/19 195 lb 14.4 oz (88.9 kg)   03/27/19 199 lb (90.3 kg)   03/20/19 195 lb (88.5 kg)     There is no height or weight on file to calculate BMI.    CBC:   Lab Results   Component Value Date    WBC 9.1 03/20/2019    RBC 4.46 03/20/2019    HGB 13.0 03/20/2019    HCT 38.6 03/20/2019    MCV 86.5 03/20/2019    RDW 14.5 03/20/2019     03/20/2019       CMP:   Lab Results   Component Value Date     03/19/2019    K 4.0 03/19/2019     03/19/2019    CO2 27 03/19/2019    BUN 16 03/19/2019    CREATININE 1.0 03/19/2019    GFRAA >60 03/19/2019    AGRATIO 1.2 03/19/2019    LABGLOM >60 03/19/2019    GLUCOSE 105 03/19/2019    PROT 6.7 03/19/2019    CALCIUM 8.7 03/19/2019    BILITOT 0.6 03/19/2019    ALKPHOS 73 03/19/2019    AST 12 03/19/2019    ALT 13 03/19/2019       POC Tests: No results for input(s): POCGLU, POCNA, POCK, POCCL, POCBUN, POCHEMO, POCHCT in the last 72 hours. Coags:   Lab Results   Component Value Date    PROTIME 14.4 10/19/2018    INR 1.26 10/19/2018       HCG (If Applicable): No results found for: PREGTESTUR, PREGSERUM, HCG, HCGQUANT     ABGs: No results found for: PHART, PO2ART, FXQ0KFH, WJH8WOR, BEART, G1QHGJXT     Type & Screen (If Applicable):  No results found for: [facility]    Anesthesia Evaluation  Patient summary reviewed and Nursing notes reviewed  Airway: Mallampati: II  TM distance: >3 FB   Neck ROM: full  Mouth opening: > = 3 FB Dental:          Pulmonary:                              Cardiovascular:  Exercise tolerance: good (>4 METS),   (+) hypertension:, valvular problems/murmurs:, CAD:,         Rhythm: regular  Rate: normal                    Neuro/Psych:               GI/Hepatic/Renal:             Endo/Other:                     Abdominal:           Vascular:                                          Anesthesia Plan      general     ASA 2       Induction: intravenous and rapid sequence. MIPS: Postoperative opioids intended, Prophylactic antiemetics administered and Postoperative trial extubation. Anesthetic plan and risks discussed with patient. Plan discussed with CRNA.     Attending anesthesiologist reviewed and agrees with Sapna Avendaño MD   7/12/2019

## 2019-07-12 NOTE — H&P
cervical adenopathy. · Neurological: Alert and oriented. Cranial nerve appears intact, No Gross deficit   · Skin: Skin is warm and dry. No rash noted. · Psychiatric: Has a normal behavior     Labs, diagnostic and imaging results reviewed. Lab Results   Component Value Date    CREATININE 1.0 10/19/2018    CREATININE 1.0 01/05/2016    AST 14 10/19/2018    ALT 16 10/19/2018             ECG 12/26/2018:  NSR 49bpm, QTcH 391    Echo: 12/17/18  Summary:    The left ventricular wall motion is normal.    Overall left ventricular ejection fraction is estimated to be 55-60%.     Left ventricular systolic function is normal. (LVEF>/=55%)    Aortic Valve leaflets appear thickened.          Echo: 5/18 normal stress echo    Cath: 2008-hx of NonQwave MI, s/p PCI of LAD, with nonobstructive disease in RCA and cfx,  NL EF    Medication:  Current Outpatient Prescriptions   Medication Sig Dispense Refill    atorvastatin (LIPITOR) 40 MG tablet TAKE 1 TABLET BY MOUTH DAILY 90 tablet 3    metoprolol succinate (TOPROL XL) 50 MG extended release tablet TAKE ONE TABLET BY MOUTH DAILY 90 tablet 3    apixaban (ELIQUIS) 5 MG TABS tablet Take 1 tablet by mouth 2 times daily 60 tablet 6    pantoprazole (PROTONIX) 40 MG tablet Take 1 tablet by mouth every morning (before breakfast) 30 tablet 3    RaNITidine HCl (ZANTAC 150 MAXIMUM STRENGTH PO) Take by mouth      calcium carbonate (TUMS) 500 MG chewable tablet Take 1 tablet by mouth daily      aspirin EC 81 MG EC tablet Take 1 tablet by mouth daily. 30 tablet 11     No current facility-administered medications for this visit.         Assessment and plan:   CAD--2008-hx of NonQwave MI, s/p PCI of LAD, with nonobstructive disease in RCA and cfx,  NL EF  5/18 stress echo nl    Atrial fibrillation/flutter -symptomatic, on eliquis since 6/27/18      10/19/2018 s/p ablation:  Pulmonary vein isolations using wide area circumferential radiofrequency ablation    - Additional ablation of typical

## 2019-07-12 NOTE — PLAN OF CARE
Problem: Falls - Risk of:  Goal: Will remain free from falls  Outcome: Ongoing  Note:   Fall Risk = medium; Up as tolerated; Ambulation Equipment: None; Call-light within reach and patient uses call-light for assistance; Bed alarm: No; Non-skid socks worn when out of bed; Side rails up 2/4; Educated patient on fall risk status and need to call for assistance; Patient verbalizes understanding of fall risk status and fall education; Pt experienced no falls/injuries this shift   Goal: Absence of physical injury  Outcome: Ongoing     Problem: Bleeding - Risk of  Goal: Absence of active bleeding  Outcome: Ongoing  Note:   Pt showing no S/S of active bleeding; R groin cath site has D-stat and pressure dressing on it d/t bleeding issues throughout the day; Now site is c/d/I.  Will continue to monitor

## 2019-07-13 VITALS
OXYGEN SATURATION: 98 % | DIASTOLIC BLOOD PRESSURE: 75 MMHG | HEIGHT: 70 IN | BODY MASS INDEX: 27.84 KG/M2 | WEIGHT: 194.45 LBS | TEMPERATURE: 97 F | HEART RATE: 71 BPM | SYSTOLIC BLOOD PRESSURE: 123 MMHG | RESPIRATION RATE: 20 BRPM

## 2019-07-13 PROCEDURE — 6370000000 HC RX 637 (ALT 250 FOR IP): Performed by: INTERNAL MEDICINE

## 2019-07-13 PROCEDURE — 99217 PR OBSERVATION CARE DISCHARGE MANAGEMENT: CPT | Performed by: NURSE PRACTITIONER

## 2019-07-13 RX ORDER — PANTOPRAZOLE SODIUM 40 MG/1
40 TABLET, DELAYED RELEASE ORAL DAILY
Qty: 30 TABLET | Refills: 3 | Status: SHIPPED | OUTPATIENT
Start: 2019-07-14 | End: 2019-08-23 | Stop reason: ALTCHOICE

## 2019-07-13 RX ORDER — METOPROLOL SUCCINATE 25 MG/1
25 TABLET, EXTENDED RELEASE ORAL DAILY
Qty: 90 TABLET | Refills: 3 | Status: SHIPPED | OUTPATIENT
Start: 2019-07-14 | End: 2019-08-23 | Stop reason: SINTOL

## 2019-07-13 RX ADMIN — PANTOPRAZOLE SODIUM 40 MG: 40 TABLET, DELAYED RELEASE ORAL at 08:13

## 2019-07-13 RX ADMIN — APIXABAN 5 MG: 5 TABLET, FILM COATED ORAL at 08:12

## 2019-07-13 RX ADMIN — ASPIRIN 81 MG: 81 TABLET ORAL at 08:13

## 2019-07-13 RX ADMIN — METOPROLOL SUCCINATE 25 MG: 25 TABLET, FILM COATED, EXTENDED RELEASE ORAL at 08:13

## 2019-07-13 ASSESSMENT — PAIN SCALES - GENERAL
PAINLEVEL_OUTOF10: 0
PAINLEVEL_OUTOF10: 0

## 2019-07-13 NOTE — DISCHARGE SUMMARY
[facility] conjunction with daily progress note  Patient denies any chest pain, throat pain, or SOB. Right groin site looks unremarkable. .     Patient ID:  Rubina Ames  [id number] 57 y.o. 1945    Admit date: 7/12/2019    Discharge date:  7/13/19    Admitting Physician: Esvin Agustin MD     Discharge NP: Vivian Barrera CNP    Admission Diagnoses: Paroxysmal atrial fibrillation [I48.0]  PAF (paroxysmal atrial fibrillation) ([facility] [I48.0]    Discharge Diagnoses:   Patient Active Problem List   Diagnosis    Hyperlipidemia    HTN (hypertension)    CAD (coronary artery disease)    Chronic cholecystitis with calculus    Heart palpitations    PAF (paroxysmal atrial fibrillation) ([facility]    Typical atrial flutter (HCC)    Chest pain    Elder's esophagus    Gastroesophageal reflux disease with esophagitis    Personal history of rectal cancer    Status post partial colectomy    CAD in native artery    Atrial fibrillation, persistent [facility])         Discharged Condition: good    Hospital Course: Rubina Ames was admitted due to atrial fibrillation. He underwent a atrial fibrillation ablation. At discharge,     Medications and discharge instructions reviewed. All questions and concerns addressed      The patient was seen and examined. Notes, labs, and recent testing reviewed. There were not complications over night. The patient is being seen for atrial fibrillation.     Objective:     /75   Pulse 66   Temp 97 °F (36.1 °C) (Temporal)   Resp 20   Ht 5' 10\" (1.778 m)   Wt 194 lb 7.1 oz (88.2 kg)   SpO2 98%   BMI 27.90 kg/m²      Intake/Output Summary (Last 24 hours) at 7/13/2019 0818  Last data filed at 7/12/2019 1754  Gross per 24 hour   Intake 1450 ml   Output 3225 ml   Net -1775 ml       Physical Exam:  General:  Awake, alert, NAD  Skin:  Warm and dry  Neck:  JVD<8, no bruit  Chest:  Clear to auscultation, no

## 2019-07-13 NOTE — PROGRESS NOTES
Pt's groin stable with no change. No hematoma or bruising present. D stat and pressure dressing remain in place. Will continue to monitor closely. Pt denies pain at his groin, but states he is looking forward to sitting up.

## 2019-07-13 NOTE — PROGRESS NOTES
Sam removed at 0800. Pt reports he voided urine at 1048.   Electronically signed by Evita Lopez RN on 7/13/2019 at 10:49 AM

## 2019-07-25 RX ORDER — APIXABAN 5 MG/1
TABLET, FILM COATED ORAL
Qty: 60 TABLET | Refills: 5 | Status: SHIPPED | OUTPATIENT
Start: 2019-07-25 | End: 2019-12-18 | Stop reason: SDUPTHER

## 2019-08-05 NOTE — PROGRESS NOTES
atorvastatin (LIPITOR) 40 MG tablet TAKE 1 TABLET BY MOUTH DAILY Yes GARCÍA Goodman CNP   calcium carbonate (TUMS) 500 MG chewable tablet Take 1 tablet by mouth daily Yes Historical Provider, MD   aspirin EC 81 MG EC tablet Take 1 tablet by mouth daily. Yes GARCÍA Goodman CNP        Past Medical History:  Past Medical History:   Diagnosis Date    Atrial fibrillation (HCC)     DOES NOT HAVE HYPERTENSION, PER PT    CAD in native artery        Past Surgical History:    has a past surgical history that includes Tooth Extraction (2013); Coronary angioplasty with stent (2008); Tonsillectomy (1955); Cholecystectomy, laparoscopic (1/13/16); and colectomy (05/08/2017). Social History:  Reviewed. reports that he has been smoking cigarettes. He has a 22.50 pack-year smoking history. He has never used smokeless tobacco. He reports that he drinks about 10.0 standard drinks of alcohol per week. He reports that he does not use drugs. Family History:  Reviewed. family history includes Heart Disease in his mother. Brothers with CAD    Review of System:  · Constitutional: Positive for fatigue. Negative for fever, night sweats, chills, weight changes, or weakness  · Skin: Negative for rash, dry skin, pruritus, bruising, bleeding, blood clots, or changes in skin pigment  · HEENT: Negative for vision changes, ringing in the ears, sore throat, dysphagia, or swollen lymph nodes  · Respiratory: Reviewed in HPI  · Cardiovascular: Reviewed in HPI  · Gastrointestinal: Negative for abdominal pain, N/V/D, constipation, or black/tarry stools  · Genito-Urinary: Negative for dysuria, incontinence, urgency, or hematuria  · Musculoskeletal: Negative for joint swelling, muscle pain, or injuries  · Neurological/Psych: Negative for confusion, seizures, dizziness, headaches, balance issues or TIA-like symptoms.  No anxiety, depression, or insomnia    Physical Examination:  Vitals:    08/23/19 0958   BP: 123/82 Pulse: 67      Wt Readings from Last 3 Encounters:   08/23/19 194 lb 12.8 oz (88.4 kg)   07/13/19 194 lb 7.1 oz (88.2 kg)   04/08/19 195 lb 14.4 oz (88.9 kg)       Constitutional: Cooperative and in no apparent distress, and appears well nourished/developed  Skin: Warm and pink; no pallor, cyanosis, bruising, or clubbing  HEENT: Symmetric and normocephalic. PERRL, EOM intact. Conjunctiva pink with clear sclera. Mucus membranes pink and moist. Teeth intact. Thyroid smooth without nodules or goiter  Respiratory: Respirations symmetric and unlabored. Lungs clear to auscultation bilaterally, no wheezing, rhonchi, or crackles  Cardiovascular:  Regular rate and rhythm. S1/S2 present without murmurs, rubs, or gallops. Peripheral pulses 2+, capillary refill < 3 seconds. No elevation of JVP. No peripheral edema  Gastrointestinal: Abdomen soft and round. Bowel sounds normoactive in all quadrants without tenderness or masses. Musculoskeletal: Bilateral upper and lower extremity strength 5/5 with full ROM. Neurological/Psych: Awake and orientated to person, place and time. Calm affect, appropriate mood. Pertinent labs, diagnostic, device, and imaging results reviewed as a part of this visit    LABS:    CBC:   Lab Results   Component Value Date    WBC 7.8 07/12/2019    HGB 14.2 07/12/2019    HCT 41.7 07/12/2019    MCV 86.8 07/12/2019     07/12/2019     BMP:   Lab Results   Component Value Date    CREATININE 1.0 07/12/2019    BUN 19 07/12/2019     07/12/2019    K 4.1 07/12/2019     07/12/2019    CO2 24 07/12/2019     Estimated Creatinine Clearance: 75 mL/min (based on SCr of 1 mg/dL).      Thyroid: No results found for: TSH, K4CIWEJ, O2BOXVT, THYROIDAB  Lipid Panel:   Lab Results   Component Value Date    CHOL 179 11/16/2015    HDL 37 11/16/2015    TRIG 241 11/16/2015     LFTs:  Lab Results   Component Value Date    ALT 16 07/12/2019    AST 15 07/12/2019    ALKPHOS 78 07/12/2019    BILITOT 0.3 07/12/2019

## 2019-08-23 ENCOUNTER — OFFICE VISIT (OUTPATIENT)
Dept: CARDIOLOGY CLINIC | Age: 74
End: 2019-08-23
Payer: MEDICARE

## 2019-08-23 VITALS
WEIGHT: 194.8 LBS | HEIGHT: 70 IN | HEART RATE: 67 BPM | SYSTOLIC BLOOD PRESSURE: 123 MMHG | BODY MASS INDEX: 27.89 KG/M2 | DIASTOLIC BLOOD PRESSURE: 82 MMHG

## 2019-08-23 DIAGNOSIS — I25.10 CORONARY ARTERY DISEASE INVOLVING NATIVE HEART WITHOUT ANGINA PECTORIS, UNSPECIFIED VESSEL OR LESION TYPE: ICD-10-CM

## 2019-08-23 DIAGNOSIS — I48.0 PAF (PAROXYSMAL ATRIAL FIBRILLATION) (HCC): ICD-10-CM

## 2019-08-23 DIAGNOSIS — I10 ESSENTIAL HYPERTENSION: ICD-10-CM

## 2019-08-23 DIAGNOSIS — R53.83 OTHER FATIGUE: Primary | ICD-10-CM

## 2019-08-23 DIAGNOSIS — E78.5 HYPERLIPIDEMIA, UNSPECIFIED HYPERLIPIDEMIA TYPE: ICD-10-CM

## 2019-08-23 DIAGNOSIS — I25.10 CAD IN NATIVE ARTERY: ICD-10-CM

## 2019-08-23 PROCEDURE — 99214 OFFICE O/P EST MOD 30 MIN: CPT | Performed by: NURSE PRACTITIONER

## 2019-08-23 PROCEDURE — 93000 ELECTROCARDIOGRAM COMPLETE: CPT | Performed by: NURSE PRACTITIONER

## 2019-12-02 ENCOUNTER — TELEPHONE (OUTPATIENT)
Dept: CARDIOLOGY CLINIC | Age: 74
End: 2019-12-02

## 2019-12-02 DIAGNOSIS — E78.00 PURE HYPERCHOLESTEROLEMIA: ICD-10-CM

## 2019-12-02 RX ORDER — ATORVASTATIN CALCIUM 40 MG/1
TABLET, FILM COATED ORAL
Qty: 90 TABLET | Refills: 3 | Status: SHIPPED | OUTPATIENT
Start: 2019-12-02 | End: 2020-12-04

## 2020-01-02 NOTE — PROGRESS NOTES
Review of System:  All other systems reviewed except for that noted above. Pertinent negatives and positives are:     · General: negative for fever, chills   · Ophthalmic ROS: negative for - eye pain or loss of vision  · ENT ROS: negative for - headaches, sore throat   · Respiratory: negative for - cough, sputum  · Cardiovascular: Reviewed in HPI  · Gastrointestinal: negative for - abdominal pain, diarrhea, N/V  · Hematology: negative for - bleeding, blood clots, bruising or jaundice  · Genito-Urinary:  negative for - Dysuria or incontinence  · Musculoskeletal: negative for - Joint swelling, muscle pain  · Neurological: negative for - confusion, dizziness, headaches   · Psychiatric: No anxiety, no depression. · Dermatological: negative for - rash    Physical Examination:  Vitals:    01/08/20 1314   BP: 134/74   Pulse: 83        · Constitutional: Oriented. No distress. · Head: Normocephalic and atraumatic. · Mouth/Throat: Oropharynx is clear and moist.   · Eyes: Conjunctivae normal. EOM are normal.   · Neck: Neck supple. No rigidity. No JVD present. · Cardiovascular: Normal rate, irregular rhythm, S1&S2. · Pulmonary/Chest: Bilateral respiratory sounds. No wheezes, No rhonchi. · Abdominal: Soft. Bowel sounds present. No distension, No tenderness. · Musculoskeletal: No tenderness. No edema    · Lymphadenopathy: Has no cervical adenopathy. · Neurological: Alert and oriented. Cranial nerve appears intact, No Gross deficit   · Skin: Skin is warm and dry. No rash noted. · Psychiatric: Has a normal behavior     Labs, diagnostic and imaging results reviewed.    Lab Results   Component Value Date    CREATININE 1.0 07/12/2019    CREATININE 1.0 03/19/2019    AST 15 07/12/2019    ALT 16 07/12/2019       ECG 1/8/2020:  sinus  QTcH    MCOT: 1/21-2/19/19  High   Low HR 45  Average HR 62  AF burden 6%    Echo:  3/20/19  Summary   -Normal left ventricle size and systolic function with an estimated ejection   fraction of 60-65%. -No regional wall motion abnormalities are seen. -There is mild to moderate concentric left ventricular hypertrophy.   -Normal diastolic function. E/e\"=11.8   -Aortic valve appears sclerotic but opens adequately. -Trivial tricuspid regurgitation.   -Estimated pulmonary artery systolic pressure is normal at 20-25 mmHg   assuming a right atrial pressure of 3 mmHg. Echo: 12/17/18  Summary:    The left ventricular wall motion is normal.    Overall left ventricular ejection fraction is estimated to be 55-60%.     Left ventricular systolic function is normal. (LVEF>/=55%)    Aortic Valve leaflets appear thickened.       Echo: 5/18 normal stress echo    Cath: 2008-hx of NonQwave MI, s/p PCI of LAD, with nonobstructive disease in RCA and cfx,  NL EF    Medication:  Current Outpatient Medications   Medication Sig Dispense Refill    diltiazem (CARDIZEM CD) 120 MG extended release capsule Take 1 capsule by mouth daily 90 capsule 3    apixaban (ELIQUIS) 5 MG TABS tablet TAKE ONE TABLET BY MOUTH TWICE A  tablet 3    atorvastatin (LIPITOR) 40 MG tablet TAKE 1 TABLET BY MOUTH DAILY 90 tablet 3    diltiazem (CARDIZEM) 30 MG tablet Take 1 tablet by mouth as needed for rapid heart rate or A fib symptoms. Do not take more than 2 per day 60 tablet 0    calcium carbonate (TUMS) 500 MG chewable tablet Take 1 tablet by mouth daily      aspirin EC 81 MG EC tablet Take 1 tablet by mouth daily. 30 tablet 11     No current facility-administered medications for this visit.         Assessment and plan:   CAD--2008-hx of NonQwave MI, s/p PCI of LAD, with nonobstructive disease in RCA and cfx,  NL EF  5/18 stress echo nl    Atrial fibrillation/flutter -symptomatic, on eliquis since 6/27/18    10/19/2018 s/p ablation:  Pulmonary vein isolations using wide area circumferential radiofrequency ablation    - Additional ablation of typical flutter CTI as a separate mechanism    May consider tikosyn or

## 2020-01-08 ENCOUNTER — OFFICE VISIT (OUTPATIENT)
Dept: CARDIOLOGY CLINIC | Age: 75
End: 2020-01-08
Payer: MEDICARE

## 2020-01-08 VITALS
SYSTOLIC BLOOD PRESSURE: 134 MMHG | HEART RATE: 83 BPM | WEIGHT: 207 LBS | BODY MASS INDEX: 29.63 KG/M2 | HEIGHT: 70 IN | DIASTOLIC BLOOD PRESSURE: 74 MMHG

## 2020-01-08 PROCEDURE — 93000 ELECTROCARDIOGRAM COMPLETE: CPT | Performed by: INTERNAL MEDICINE

## 2020-01-08 PROCEDURE — 99214 OFFICE O/P EST MOD 30 MIN: CPT | Performed by: INTERNAL MEDICINE

## 2020-01-08 RX ORDER — DILTIAZEM HYDROCHLORIDE 120 MG/1
120 CAPSULE, COATED, EXTENDED RELEASE ORAL DAILY
Qty: 90 CAPSULE | Refills: 3 | Status: SHIPPED | OUTPATIENT
Start: 2020-01-08 | End: 2020-08-27 | Stop reason: ALTCHOICE

## 2020-01-08 RX ORDER — METOPROLOL SUCCINATE 25 MG/1
25 TABLET, EXTENDED RELEASE ORAL DAILY
COMMUNITY
Start: 2019-03-21 | End: 2020-01-08 | Stop reason: ALTCHOICE

## 2020-02-17 ENCOUNTER — NURSE ONLY (OUTPATIENT)
Dept: CARDIOLOGY CLINIC | Age: 75
End: 2020-02-17

## 2020-03-10 ENCOUNTER — TELEPHONE (OUTPATIENT)
Dept: CARDIOLOGY CLINIC | Age: 75
End: 2020-03-10

## 2020-03-10 NOTE — TELEPHONE ENCOUNTER
We have over 3 weeks of monitoring. He may send the monitor back if he would like. No need to retry wearing any longer.     Sravani Guzman, APRN-CNP

## 2020-03-25 NOTE — TELEPHONE ENCOUNTER
What dose of Xarelto should this pt be switched to? Pt is currently taking Eliquis 5 mg BID. Last OV with MXA 1/8/2020.   Plan:   30 day monitor upon return from [place] in 1 month  Stop metoprolol, change to cardizem  Monitor B/P, if greater than 130/80 call the office   Change to Xarelto when he runs out of Eliquis  Follow up in 6 months

## 2020-06-12 ENCOUNTER — TELEPHONE (OUTPATIENT)
Dept: CARDIOLOGY CLINIC | Age: 75
End: 2020-06-12

## 2020-06-12 PROCEDURE — 93228 REMOTE 30 DAY ECG REV/REPORT: CPT | Performed by: INTERNAL MEDICINE

## 2020-06-12 NOTE — TELEPHONE ENCOUNTER
I Printed the [address] and placed in the in EP room for review. I called and let the pt know that the results are being reviewed by the doctor. Pt also wanted to know about his BP medication he stated that he was wanting to see if he could get the medication cut back.

## 2020-06-12 NOTE — TELEPHONE ENCOUNTER
Pt calling he did a heart monitor back in Feb turned it in, in March and never got the results from it. What are the results? Also, MONICA put him on diltiazem (CARDIZEM CD) 120 MG extended release capsule for high BP last OV and his blood pressures have been running low to the point he has headaches, tired all the time, slightly dizzy at times. In Jan his BP average was 117/60-70 lowest reading was 106/60 last week it was 116/72. Pt wanting to know if he should cut back on the dosage or maybe even go off it altogether?  Pls call to advise Thank you

## 2020-08-27 ENCOUNTER — OFFICE VISIT (OUTPATIENT)
Dept: CARDIOLOGY CLINIC | Age: 75
End: 2020-08-27
Payer: MEDICARE

## 2020-08-27 VITALS
HEART RATE: 78 BPM | WEIGHT: 205.4 LBS | SYSTOLIC BLOOD PRESSURE: 128 MMHG | BODY MASS INDEX: 29.41 KG/M2 | DIASTOLIC BLOOD PRESSURE: 76 MMHG | HEIGHT: 70 IN

## 2020-08-27 PROCEDURE — 99213 OFFICE O/P EST LOW 20 MIN: CPT | Performed by: INTERNAL MEDICINE

## 2020-08-27 PROCEDURE — 93000 ELECTROCARDIOGRAM COMPLETE: CPT | Performed by: INTERNAL MEDICINE

## 2020-08-27 NOTE — PROGRESS NOTES
[facility]   Electrophysiology Follow up  Date: 8/27/2020  I had the privilege of visiting Jorden Deep in the office for follow up after ablation    CC:   afib  HPI:Noel Benavides is a 70 y.o. male being seen in follow up s/p ablation for Afib. Followed since diagnosed with AF and starting eliquis.     Cardiac angiogram 2008-hx of NonQwave MI, s/p PCI of LAD, with nonobstructive disease in RCA and cfx,  NL EF. He had an episode of Afib in 2001. He has also been treated for rectal cancer. 10/19/2018 underwent Afib Ablation with PVI and Typical Flutter ablation with CTI as a separate mechanism. 12/16/18 admitted to [facility] for Afib RVR, converted overnight with Cardizem drip. 7/12/19 radiofrequency ablation of atrial fibrillation and pulmonary veins isolation. Ablation of arrhythmogenic focus as a separate mechanism. Post ablation he reports fatigue. Metoprolol was discontinued. He will ise cardizem prn for elevated HR. Interval History:   Janelle Mcguire presents to the office in follow up. He states he is feeling improved since stopping his diltiazem. He denies any incidence of afib. Patient denies lightheadedness, dizziness, chest pain, palpitations, orthopnea, edema, presyncope or syncope. Past Medical History:   Diagnosis Date    Atrial fibrillation (HCC)     DOES NOT HAVE HYPERTENSION, PER PT    CAD in native artery         Past Surgical History:   Procedure Laterality Date    CHOLECYSTECTOMY, LAPAROSCOPIC  1/13/16    LAPAROSCOPIC CHOLECYSTECTOMY WITH CHOLANGIOGRAM    [facility]    CORONARY ANGIOPLASTY WITH STENT PLACEMENT  2008    TONSILLECTOMY  1955    TOOTH EXTRACTION  2013    wisdom       Allergies:  No Known Allergies    Social History:  Reviewed. reports that he has been smoking cigarettes. He has a 22.50 pack-year smoking history.  He has never used smokeless tobacco. He reports current alcohol use of about 10.0 standard drinks of alcohol per week. He reports that he does not use drugs. Family History:  Reviewed. family history includes Heart Disease in his mother. Review of System:  All other systems reviewed except for that noted above. Pertinent negatives and positives are:     · General: negative for fever, chills   · Ophthalmic ROS: negative for - eye pain or loss of vision  · ENT ROS: negative for - headaches, sore throat   · Respiratory: negative for - cough, sputum  · Cardiovascular: Reviewed in HPI  · Gastrointestinal: negative for - abdominal pain, diarrhea, N/V  · Hematology: negative for - bleeding, blood clots, bruising or jaundice  · Genito-Urinary:  negative for - Dysuria or incontinence  · Musculoskeletal: negative for - Joint swelling, muscle pain  · Neurological: negative for - confusion, dizziness, headaches   · Psychiatric: No anxiety, no depression. · Dermatological: negative for - rash    Physical Examination:  Vitals:    08/27/20 1451   BP: 128/76   Pulse: 78        · Constitutional: Oriented. No distress. · Head: Normocephalic and atraumatic. · Mouth/Throat: Oropharynx is clear and moist.   · Eyes: Conjunctivae normal. EOM are normal.   · Neck: Neck supple. No rigidity. No JVD present. · Cardiovascular: Normal rate, irregular rhythm, S1&S2. · Pulmonary/Chest: Bilateral respiratory sounds. No wheezes, No rhonchi. · Abdominal: Soft. Bowel sounds present. No distension, No tenderness. · Musculoskeletal: No tenderness. No edema    · Lymphadenopathy: Has no cervical adenopathy. · Neurological: Alert and oriented. Cranial nerve appears intact, No Gross deficit   · Skin: Skin is warm and dry. No rash noted. · Psychiatric: Has a normal behavior     Labs, diagnostic and imaging results reviewed.    Lab Results   Component Value Date    CREATININE 1.0 07/12/2019    CREATININE 1.0 03/19/2019    AST 15 07/12/2019    ALT 16 07/12/2019       ECG 8/27/2020:    Sinus Rhythm    MCOT: 1/21-2/19/19  High   Low HR 45  Average HR 62  AF burden 6%    Echo:  3/20/19  Summary   -Normal left ventricle size and systolic function with an estimated ejection   fraction of 60-65%. -No regional wall motion abnormalities are seen. -There is mild to moderate concentric left ventricular hypertrophy.   -Normal diastolic function. E/e\"=11.8   -Aortic valve appears sclerotic but opens adequately. -Trivial tricuspid regurgitation.   -Estimated pulmonary artery systolic pressure is normal at 20-25 mmHg   assuming a right atrial pressure of 3 mmHg. Echo: 12/17/18  Summary:    The left ventricular wall motion is normal.    Overall left ventricular ejection fraction is estimated to be 55-60%.     Left ventricular systolic function is normal. (LVEF>/=55%)    Aortic Valve leaflets appear thickened.       Echo: 5/18 normal stress echo    Cath: 2008-hx of NonQwave MI, s/p PCI of LAD, with nonobstructive disease in RCA and cfx,  NL EF    Medication:  Current Outpatient Medications   Medication Sig Dispense Refill    rivaroxaban (XARELTO) 20 MG TABS tablet Take 1 tablet by mouth daily (with breakfast) Must take with food 90 tablet 1    atorvastatin (LIPITOR) 40 MG tablet TAKE 1 TABLET BY MOUTH DAILY 90 tablet 3    calcium carbonate (TUMS) 500 MG chewable tablet Take 1 tablet by mouth daily      aspirin EC 81 MG EC tablet Take 1 tablet by mouth daily. 30 tablet 11    diltiazem (CARDIZEM CD) 120 MG extended release capsule Take 1 capsule by mouth daily (Patient not taking: Reported on 8/27/2020) 90 capsule 3    diltiazem (CARDIZEM) 30 MG tablet Take 1 tablet by mouth as needed for rapid heart rate or A fib symptoms. Do not take more than 2 per day (Patient not taking: Reported on 8/27/2020) 60 tablet 0     No current facility-administered medications for this visit.         Assessment and plan:   CAD-   -2008-hx of NonQwave MI, s/p PCI of LAD, with nonobstructive disease in RCA and cfx,  NL EF   -5/18 stress echo nl    Atrial fibrillation/flutter    -ECG shows SR      -Xarelto 20 my daily   -10/19/2018 s/p ablation:  Pulmonary vein isolations using wide area circumferential radiofrequency ablation    -Additional ablation of typical flutter CTI as a separate mechanism     -Stopped Cardizem 120 mg daily, His fatigue has imrpoved   -Monitor 1/8/2020 showed no afib/only SR     Continue current treatment. HTN  Vitals:    08/27/20 1451   BP: 128/76   Pulse: 78      -Home BP monitoring encourage with a BP goal <130/80   -Not on any BP control meds, well controlled today     Hchol-   -11/16/15  tri 241 ldl 94 hdl 37   -On lipitor      Plan:   1 year f/u    Thank you for allowing me to participate in the care of Mireille Leung. Further evaluation will be based upon the patient's clinical course and testing results. All questions and concerns were addressed to the patient/family. Alternatives to my treatment were discussed. I have discussed the above stated plan and the patient verbalized understanding and agreed with the plan. NOTE: This report was transcribed using voice recognition software. Every effort was made to ensure accuracy, however, inadvertent computerized transcription errors may be present. Dr Karsten Gill MPH  [facility] [address]   Office: [phone]     Scribe attestation:  This note was scribed in the presence of Tawanda Resendiz MD by Jose Maher RN    I, Dr. Tawanda Resendiz personally performed the services described in this documentation as scribed by RN in my presence, and it is both accurate and complete.

## 2020-12-03 ENCOUNTER — NURSE TRIAGE (OUTPATIENT)
Dept: OTHER | Facility: CLINIC | Age: 75
End: 2020-12-03

## 2020-12-03 NOTE — TELEPHONE ENCOUNTER
Received call from [facility], [facility], later [facility] [place]. Patient called in states on 12/2/20 he went to urinate and it was blood red and this occurred 3-4 times yesterday and once this morning but states last time he urinated, it resolved, see triage assessment below. Care Advice provided; patient acknowledged understanding of care advice and is in agreement with plan. Patient has no further questions; instructed to call back for any new or worsening symptoms. Call soft transferred to [address] to schedule appointment. Reason for Disposition   Taking Coumadin (warfarin) or other strong blood thinner, or known bleeding disorder (e.g., thrombocytopenia)    Answer Assessment - Initial Assessment Questions  1. COLOR of URINE: \"Describe the color of the urine. \"  (e.g., tea-colored, pink, red, blood clots, bloody)      Red and thinks there was a clot yesterday and this morning    2. ONSET: \"When did the bleeding start?\"      12/2/20    3. EPISODES: \"How many times has there been blood in the urine? \" or \"How many times today? \"     Since onset 5 times; today only once    4. PAIN with URINATION: \"Is there any pain with passing your urine? \" If so, ask: \"How bad is the pain? \"  (Scale 1-10; or mild, moderate, severe)     - MILD - complains slightly about urination hurting     - MODERATE - interferes with normal activities       - SEVERE - excruciating, unwilling or unable to urinate because of the pain       Denies pain    5. FEVER: \"Do you have a fever? \" If so, ask: \"What is your temperature, how was it measured, and when did it start? \"     Denies fever    6. ASSOCIATED SYMPTOMS: Ceil Marrow you passing urine more frequently than usual?\"      Denies any other associated symptoms but patient does take daily blood thinners    7. OTHER SYMPTOMS: \"Do you have any other symptoms? \" (e.g., back/flank pain, abdominal pain, vomiting)      1/4\" long clots (only occurred twice and patient states it was at the end

## 2020-12-04 RX ORDER — ATORVASTATIN CALCIUM 40 MG/1
TABLET, FILM COATED ORAL
Qty: 90 TABLET | Refills: 3 | Status: SHIPPED | OUTPATIENT
Start: 2020-12-04 | End: 2021-12-17

## 2020-12-04 NOTE — TELEPHONE ENCOUNTER
Received refill request for Lipitor from [facility]. Last OV: 08/27/2020 w.  MXA    Last Refill: 12/02/2019 #90 w/ 3 refills    Next Appointment: on recall list for appt on 08/27/2021 w/ NPAL

## 2020-12-18 ENCOUNTER — TELEPHONE (OUTPATIENT)
Dept: CARDIOLOGY CLINIC | Age: 75
End: 2020-12-18

## 2020-12-18 NOTE — TELEPHONE ENCOUNTER
----- Message from Francisca Mendez MD sent at 12/8/2020  3:30 PM EST -----  Please call him and explain that he needs to be evaluated for stone or other causes of bleeding(cancer, etc)refer him to urology for hematuria.

## 2020-12-18 NOTE — TELEPHONE ENCOUNTER
Spoke with the patient and advised him of the message below per MXA below. Patient voiced understanding stating he was aware.  Call complete

## 2020-12-18 NOTE — TELEPHONE ENCOUNTER
Referral placed for urology per Request from Dr. Martinez Must, Please call patient and let him know to see urology

## 2021-03-05 RX ORDER — RIVAROXABAN 20 MG/1
TABLET, FILM COATED ORAL
Qty: 90 TABLET | Refills: 3 | Status: SHIPPED | OUTPATIENT
Start: 2021-03-05 | End: 2022-03-09 | Stop reason: SDUPTHER

## 2021-03-05 NOTE — TELEPHONE ENCOUNTER
Received refill request for xarelto from [address] [place] [address].     Last ov:8/27/2020 MXA    Last Refill:3/25/2020 #90 with 1 refill    Next appointment:8/27/2021 A

## 2021-12-17 DIAGNOSIS — E78.00 PURE HYPERCHOLESTEROLEMIA: ICD-10-CM

## 2021-12-17 RX ORDER — ATORVASTATIN CALCIUM 40 MG/1
TABLET, FILM COATED ORAL
Qty: 90 TABLET | Refills: 0 | Status: SHIPPED | OUTPATIENT
Start: 2021-12-17 | End: 2022-03-14 | Stop reason: SDUPTHER

## 2021-12-17 NOTE — TELEPHONE ENCOUNTER
Received refill request for atorvastatin from [facility].     Last ov: 8/27/2020 NPSR    Last labs:no recent labs    Last Refill: 90 with 3 refills 12/4/2020    Next appointment: no future appt

## 2022-03-09 ENCOUNTER — OFFICE VISIT (OUTPATIENT)
Dept: CARDIOLOGY CLINIC | Age: 77
End: 2022-03-09
Payer: MEDICARE

## 2022-03-09 VITALS
OXYGEN SATURATION: 97 % | HEIGHT: 70 IN | WEIGHT: 211 LBS | SYSTOLIC BLOOD PRESSURE: 128 MMHG | HEART RATE: 82 BPM | DIASTOLIC BLOOD PRESSURE: 70 MMHG | BODY MASS INDEX: 30.21 KG/M2

## 2022-03-09 DIAGNOSIS — Z79.01 ON RIVAROXABAN THERAPY: ICD-10-CM

## 2022-03-09 DIAGNOSIS — I10 PRIMARY HYPERTENSION: ICD-10-CM

## 2022-03-09 DIAGNOSIS — I25.118 CORONARY ARTERY DISEASE OF NATIVE ARTERY OF NATIVE HEART WITH STABLE ANGINA PECTORIS (HCC): ICD-10-CM

## 2022-03-09 DIAGNOSIS — E78.2 MIXED HYPERLIPIDEMIA: ICD-10-CM

## 2022-03-09 DIAGNOSIS — I48.91 ATRIAL FIBRILLATION, UNSPECIFIED TYPE (HCC): Primary | ICD-10-CM

## 2022-03-09 PROCEDURE — 99214 OFFICE O/P EST MOD 30 MIN: CPT | Performed by: NURSE PRACTITIONER

## 2022-03-09 PROCEDURE — 93000 ELECTROCARDIOGRAM COMPLETE: CPT | Performed by: NURSE PRACTITIONER

## 2022-03-09 NOTE — PROGRESS NOTES
[facility]   Electrophysiology  Shawn Mack, APRN-CNP  Attending EP: Dr. Asuncion Romo   Date: 3/9/2022  I had the privilege of visiting Joie Hua in the office. Chief Complaint:   Chief Complaint   Patient presents with    Atrial Fibrillation    Coronary Artery Disease     History of Present Illness: History obtained from patient and medical record. Joie Hua is 78 y.o. male with a past medical history of rectal CA, CAD s/p PCI of LAD 2008 for NSTEMI and atrial fibrillation. S/p AF ablation with PVI, typical flutter with CTI 10/19/2018. Admitted to [facility] 12/2018 for AF/RVR. S/p RFCA of AF with PVI 7/12/2019     Interval history: Today, Joie Hua is being seen for PAF and hypertension. Reports that he has had no known recurrence of AF since his ablation. He could tell when he was out of rhythm before. He lost his PCP due to him leaving town and has not re-established with PCP. No recent blood work. Cr was slightly elevated in 2020 and crcl 70 ml/min. He comes today for refills. He is asking when he can stop AC. He feels great and remains very active. Denies CP, palpitations, SOB, or swelling. He has not follows with IC in years. Last angio was 2019 showed nonobstructive and he was advised to quit smoking. He continues to smoke. He would like to establish with IC at [facility]. Denies having chest pain, palpitations, shortness of breath, orthopnea or dizziness at the time of this visit. With regard to medication therapy the patient has been compliant with prescribed regimen. He has tolerated therapy to date. Assessment:  Paroxysmal Atrial Fibrillation/Flutter  - ECG today shows SR  - Symptomatic  - [id number] score: 3 (age and CAD) ; [id number] Vasc score and anticoagulation discussed. High risk for stroke and thromboembolism.  Anticoagulation is recommended.   ~ Continue Xarelto 20 mg daily crcl 70 ml/min, no significant bleeding or bruising reported  - Afib risk factors including age, HTN, obesity, inactivity and MEIR were discussed with patient. Risk factor modification recommended    - Treatment options including cardioversion, rate control strategy, antiarrhythmics, anticoagulation and possible ablation were discussed with patient. Risks, benefits and alternative of each treatment options were explained. All questions answered    ~ S/p AF ablation with PVI, typical flutter with CTI 10/19/2018    ~ S/p RFCA of AF with PVI 7/12/2019  CAD   - Hx pf PCI to LAD 2008 with RCA and cx disease   - No reports of angina   - Continue ASA/statin  Hypertension   - Hx of, does not require medication management  Hyperlipidemia   - Lipitor   - Repeat lipid and LDL  Nicotine Dependence   - Recommended complete cessation, he does not intend to quit  Plan  - Needs PCP, discussed with patient  - lipid, CMP, CBC, TSH fasting prior to refilling meds  - Gave 4 weeks Xarelto refills  - He will call office if symptoms develop    F/U: Follow-up with EP yearly and needs to establish with IC, Dr. Sabino Sadler did his last angio and is in [address]; he will schedule there  Call [facility] at [phone] with any questions    Allergies:  No Known Allergies  Home Medications:  Prior to Visit Medications    Medication Sig Taking? Authorizing Provider   atorvastatin (LIPITOR) 40 MG tablet TAKE ONE TABLET BY MOUTH DAILY  GARCÍA Lomas CNP   XARELTO 20 MG TABS tablet TAKE ONE TABLET BY MOUTH DAILY WITH BREAKFAST. MUST TAKE WITH FOOD  GARCÍA Headley CNP   calcium carbonate (TUMS) 500 MG chewable tablet Take 1 tablet by mouth daily  Historical Provider, MD   aspirin EC 81 MG EC tablet Take 1 tablet by mouth daily.   GARCÍA Ramachandran CNP      Past Medical History:  Past Medical History:   Diagnosis Date    Atrial fibrillation (HCC)     DOES NOT HAVE HYPERTENSION, PER PT    CAD in native artery      Past Surgical History:    has a past surgical history that includes Tooth Extraction (2013); Coronary angioplasty with stent (2008); Tonsillectomy (1955); Cholecystectomy, laparoscopic (1/13/16); and colectomy (05/08/2017). Social History:  Reviewed. reports that he has been smoking cigarettes. He has a 22.50 pack-year smoking history. He has never used smokeless tobacco. He reports current alcohol use of about 10.0 standard drinks of alcohol per week. He reports that he does not use drugs. Family History:  Reviewed. family history includes Heart Disease in his mother. Denies family history of sudden cardiac death, arrhythmia, premature CAD    Review of System:  · Constitutional: No weight changes or weakness  · HEENT: No visual changes. No mouth sores or sore throat. · Cardiovascular: denies chest pain, denies dyspnea on exertion, denies palpitations or denies loss of consciousness. No cough, hemoptysis, denies pleuritic pain, or phlebitis. denies dizziness. · Respiratory: denies cough or wheezing. · Gastrointestinal: Negative, No blood in stools. · Genitourinary: No hematuria. · Neurological: No focal weakness  · Psychiatric: No confusion, anxiety, or depression. · Hem/Lymph: Denies abnormal bruising or bleeding. Physical Examination:  There were no vitals filed for this visit. Wt Readings from Last 3 Encounters:   08/27/20 205 lb 6.4 oz (93.2 kg)   01/08/20 207 lb (93.9 kg)   08/23/19 194 lb 12.8 oz (88.4 kg)      Constitutional: Cooperative and in no apparent distress, and appears well nourished   Skin: Warm and pink; no cyanosis or bruising   HEENT: Symmetric and normocephalic. Conjunctiva pink with clear sclera. Mucus membranes pink and moist. No visible masses/goiter   Respiratory: Respirations symmetric and unlabored. Lungs clear to auscultation bilaterally, no wheezing, rhonchi, or crackles.  Cardiovascular:  regular rate and rhythm. S1 & S2 present, negative for murmur. negative elevation of JVP. No peripheral edema.      Musculoskeletal:  No focal weakness.  Neurological/Psych: Awake and orientated to person, place and time. Calm affect, appropriate mood. Pertinent labs, diagnostic, device, and imaging results reviewed as a part of this visit    LABS    CBC:   Lab Results   Component Value Date    WBC 7.8 07/12/2019    HGB 14.2 07/12/2019    HCT 41.7 07/12/2019    MCV 86.8 07/12/2019     07/12/2019     BMP:   Lab Results   Component Value Date    CREATININE 1.0 07/12/2019    BUN 19 07/12/2019     07/12/2019    K 4.1 07/12/2019     07/12/2019    CO2 24 07/12/2019     CrCl cannot be calculated (Patient's most recent lab result is older than the maximum 120 days allowed. ). No results found for: BNP    Thyroid: No results found for: TSH, S2UMRAO, X7BTQWG, THYROIDAB  Lipid Panel:   Lab Results   Component Value Date    CHOL 179 11/16/2015    HDL 37 11/16/2015    TRIG 241 11/16/2015     LFTs:  Lab Results   Component Value Date    ALT 16 07/12/2019    AST 15 07/12/2019    ALKPHOS 78 07/12/2019    BILITOT 0.3 07/12/2019     Coags:   Lab Results   Component Value Date    PROTIME 12.1 07/12/2019    INR 1.06 07/12/2019     ECG: 3/9/2022 SR HR 76, , QRS 94, QTc 382    Echo: 3/20/2019  Summary   -Normal left ventricle size and systolic function with an estimated ejection   fraction of 60-65%. -No regional wall motion abnormalities are seen. -There is mild to moderate concentric left ventricular hypertrophy.   -Normal diastolic function. E/e\"=11.8   -Aortic valve appears sclerotic but opens adequately. -Trivial tricuspid regurgitation.   -Estimated pulmonary artery systolic pressure is normal at 20-25 mmHg   assuming a right atrial pressure of 3 mmHg. GXT: 2009  Normal myocardial perfusion  Cath: 2019  LVEDP - 8  LVgram - normal WM and EF 55%  Cors'  LM - nl  LAD - prox ectasia ff by a patent stent.  irreg throughout with a 50-60% in the apical portion of the vessel  LCX - 4 OMs and minor irreg  RCA - prox 50% with irreg up to 30-40% in the mid portion.      Plan; continue medical therapy; ask him to stop smoking.      Cardiac angiogram 2008-hx of NonQwave MI, s/p PCI of LAD, with nonobstructive disease in RCA and cfx  Diet & Exercise:   The patient is counseled to follow a low salt diet to assure blood pressure remains controlled for cardiovascular risk factor modification   The patient is counseled to avoid excess caffeine, and energy drinks as this may exacerbated ectopy and arrhythmia   The patient is counseled to lose weight to control cardiovascular risk factors   Exercise program discussed: To improve overall cardiovascular health, the patient is instructed to increase cardiovascular related activities with a goal of 150 min/week of moderate level activity or 10,000 steps per day. Encouraged to perform as much activity as tolerated     I have addressed the patient's cardiac risk factors and adjusted pharmacologic treatment as needed. In addition, I have reinforced the need for patient directed risk factor modification. I independently reviewed the ECG    All questions and concerns were addressed with the patient. Alternatives to treatment were discussed. Thank you for allowing to us to participate in the care of Aracelis Bernardo.     GARCÍA Díaz-CNP  [facility]   Office: [phone]

## 2022-03-09 NOTE — PATIENT INSTRUCTIONS
- No medication changes  - Call office if symptoms develop  - Quit smoking  - Follow up 1 year  - Establish with Dr. Beth Hall

## 2022-03-11 DIAGNOSIS — I48.91 ATRIAL FIBRILLATION, UNSPECIFIED TYPE (HCC): ICD-10-CM

## 2022-03-11 DIAGNOSIS — E78.2 MIXED HYPERLIPIDEMIA: ICD-10-CM

## 2022-03-11 DIAGNOSIS — Z79.01 ON RIVAROXABAN THERAPY: ICD-10-CM

## 2022-03-11 DIAGNOSIS — I10 PRIMARY HYPERTENSION: ICD-10-CM

## 2022-03-11 LAB
ALBUMIN SERPL-MCNC: 3.9 G/DL
ALP BLD-CCNC: 78 U/L
ALT SERPL-CCNC: 18 U/L
ANION GAP SERPL CALCULATED.3IONS-SCNC: 10 MMOL/L
AST SERPL-CCNC: 16 U/L
BASOPHILS ABSOLUTE: 0.1 /ΜL
BASOPHILS RELATIVE PERCENT: 1 %
BILIRUB SERPL-MCNC: 0.6 MG/DL (ref 0.1–1.4)
BUN BLDV-MCNC: 27 MG/DL
CALCIUM SERPL-MCNC: 10.2 MG/DL
CHLORIDE BLD-SCNC: 99 MMOL/L
CHOLESTEROL, FASTING: 147
CO2: 25 MMOL/L
CREAT SERPL-MCNC: 1.09 MG/DL
EOSINOPHILS ABSOLUTE: 0.2 /ΜL
EOSINOPHILS RELATIVE PERCENT: 3 %
GFR CALCULATED: 71
GLUCOSE BLD-MCNC: 100 MG/DL
HCT VFR BLD CALC: 42.3 % (ref 41–53)
HDLC SERPL-MCNC: 35 MG/DL (ref 35–70)
HEMOGLOBIN: 14.3 G/DL (ref 13.5–17.5)
LDL CHOLESTEROL CALCULATED: 73 MG/DL (ref 0–160)
LYMPHOCYTES ABSOLUTE: 1.4 /ΜL
LYMPHOCYTES RELATIVE PERCENT: 16 %
MCH RBC QN AUTO: 30.1 PG
MCHC RBC AUTO-ENTMCNC: 33.8 G/DL
MCV RBC AUTO: 89.2 FL
MONOCYTES ABSOLUTE: 0.9 /ΜL
MONOCYTES RELATIVE PERCENT: 10 %
NEUTROPHILS ABSOLUTE: 6.2 /ΜL
NEUTROPHILS RELATIVE PERCENT: 70 %
PDW BLD-RTO: 14.3 %
PLATELET # BLD: 199 K/ΜL
PMV BLD AUTO: 8 FL
POTASSIUM SERPL-SCNC: 4.4 MMOL/L
RBC # BLD: 4.74 10^6/ΜL
SODIUM BLD-SCNC: 134 MMOL/L
TOTAL PROTEIN: 7
TRIGLYCERIDE, FASTING: 193
TSH SERPL DL<=0.05 MIU/L-ACNC: 1.62 UIU/ML
WBC # BLD: 8.8 10^3/ML

## 2022-03-14 DIAGNOSIS — E78.00 PURE HYPERCHOLESTEROLEMIA: ICD-10-CM

## 2022-03-14 RX ORDER — ATORVASTATIN CALCIUM 40 MG/1
40 TABLET, FILM COATED ORAL NIGHTLY
Qty: 90 TABLET | Refills: 3 | Status: SHIPPED | OUTPATIENT
Start: 2022-03-14 | End: 2022-03-14 | Stop reason: SDUPTHER

## 2022-03-14 RX ORDER — ATORVASTATIN CALCIUM 40 MG/1
40 TABLET, FILM COATED ORAL NIGHTLY
Qty: 90 TABLET | Refills: 3 | Status: SHIPPED | OUTPATIENT
Start: 2022-03-14

## 2022-03-14 RX ORDER — ASPIRIN 81 MG/1
81 TABLET ORAL DAILY
Qty: 90 TABLET | Refills: 3 | Status: CANCELLED | OUTPATIENT
Start: 2022-03-14

## 2022-03-14 NOTE — TELEPHONE ENCOUNTER
Spoke with the patient and advised him of the message below per NPAL . Pt confirmed pharmacy and will be sending rx to the pharmacy . Pt states he purchases baby asa OTC and does not need to be sent at this time .  Call complete

## 2022-03-14 NOTE — TELEPHONE ENCOUNTER
Called and spoke with patient he states that he spoke with NPAL already. Please send scripts to [facility].

## 2022-03-14 NOTE — TELEPHONE ENCOUNTER
Please call patient and let him know that his blood work was all normal.  I have pended his rx refills. Please verify the pharmacy he would like them sent to. OK to send once pharmacy is verified.      GARCÍA King-CNP

## 2022-06-08 NOTE — PROGRESS NOTES
[facility]   Cardiac Evaluation      Patient: Grace Rios  YOB: 1945         Chief Complaint   Patient presents with    Hyperlipidemia    Hypertension    Coronary Artery Disease        Referring provider: No primary care provider on file. History of Present Illness:   Mr [facility] is seen today in follow up, last seen in 2019 when he presented to [id number] [place] [address] with chest pain. LHC was performed 3/20/19 that revealed a patent stent in the LAD and other noncritical disease and EF 55%. His cardiac history includes PCI of LAD 2008 for NSTEMI and atrial fibrillation. He is s/p AF ablation with PVI, typical flutter with CTI 10/19/2018. He was admitted to [facility] 12/2018 for AF/RVR. He underwent RFCA of AF with PVI 7/12/2019. He has been followed by EP exclusively since 2019. Unfortunately, he continues to smoke. Today, Mr [facility] states he is physically active. He works in his yard and is currently building a walion. He golfs and uses weed eater in his yard. Malvina Gosselin denies chest pain, palpitations, SMITH, dizziness, or edema. He has not had any atrial fibrillation that he can tell and was very aware of his arrhythmia. Past Medical History:   has a past medical history of Atrial fibrillation ([address] and CAD in native artery. Surgical History:   has a past surgical history that includes Tooth Extraction (2013); Coronary angioplasty with stent (2008); Tonsillectomy (1955); Cholecystectomy, laparoscopic (1/13/16); and colectomy (05/08/2017). Current Outpatient Medications   Medication Sig Dispense Refill    atorvastatin (LIPITOR) 40 MG tablet Take 1 tablet by mouth at bedtime 90 tablet 3    rivaroxaban (XARELTO) 20 MG TABS tablet TAKE ONE TABLET BY MOUTH DAILY WITH BREAKFAST. MUST TAKE WITH FOOD 90 tablet 3    calcium carbonate (TUMS) 500 MG chewable tablet Take 1 tablet by mouth daily      aspirin EC 81 MG EC tablet Take 1 tablet by mouth daily.  30 tablet 11     No current facility-administered medications for this visit. Allergies:  Patient has no known allergies. Social History:  Social History     Socioeconomic History    Marital status:      Spouse name: Durand Matter Number of children: 2    Years of education: Not on file    Highest education level: Not on file   Occupational History    Occupation: retired    Tobacco Use    Smoking status: Current Every Day Smoker     Packs/day: 0.50     Years: 45.00     Pack years: 22.50     Types: Cigarettes    Smokeless tobacco: Never Used   Vaping Use    Vaping Use: Never used   Substance and Sexual Activity    Alcohol use: Yes     Alcohol/week: 10.0 standard drinks     Types: 10 Cans of beer per week    Drug use: No    Sexual activity: Not on file   Other Topics Concern    Not on file   Social History Narrative    Not on file     Social Determinants of Health     Financial Resource Strain:     Difficulty of Paying Living Expenses: Not on file   Food Insecurity:     Worried About Running Out of Food in the Last Year: Not on file    Alexx of Food in the Last Year: Not on file   Transportation Needs:     Lack of Transportation (Medical): Not on file    Lack of Transportation (Non-Medical):  Not on file   Physical Activity:     Days of Exercise per Week: Not on file    Minutes of Exercise per Session: Not on file   Stress:     Feeling of Stress : Not on file   Social Connections:     Frequency of Communication with Friends and Family: Not on file    Frequency of Social Gatherings with Friends and Family: Not on file    Attends Yazidi Services: Not on file    Active Member of Clubs or Organizations: Not on file    Attends Club or Organization Meetings: Not on file    Marital Status: Not on file   Intimate Partner Violence:     Fear of Current or Ex-Partner: Not on file    Emotionally Abused: Not on file    Physically Abused: Not on file    Sexually Abused: Not on file   Housing Stability:     Unable to Pay for Housing in the Last Year: Not on file    Number of Places Lived in the Last Year: Not on file    Unstable Housing in the Last Year: Not on file       Family History:   Family History   Problem Relation Age of Onset    Heart Disease Mother      Family history has been reviewed and not pertinent except as noted above. Review of Systems:   · Constitutional: there has been no unanticipated weight loss. No change in energy or activity level   · Eyes: No visual changes   · ENT: No Headaches, hearing loss or vertigo. No mouth sores or sore throat. · Cardiovascular: Reviewed in HPI  · Respiratory: No cough or wheezing, no sputum production. · Gastrointestinal: No abdominal pain, appetite loss, blood in stools. No change in bowel or bladder habits. · Genitourinary: No nocturia, dysuria, trouble voiding  · Musculoskeletal:  No gait disturbance, weakness or joint complaints. · Integumentary: No rash or pruritis. · Neurological: No headache, change in muscle strength, numbness or tingling. No change in gait, balance, coordination, mood, affect, memory, mentation, behavior. · Psychiatric: No anxiety or depression  · Endocrine: No malaise or fever  · Hematologic/Lymphatic: No abnormal bruising or bleeding, blood clots or swollen lymph nodes. · Allergic/Immunologic: No nasal congestion or hives. Physical Examination:    Vitals:    06/10/22 1330   BP: 118/60   Site: Left Upper Arm   Position: Sitting   Cuff Size: Medium Adult   Pulse: 72   SpO2: 98%   Weight: 203 lb 6.4 oz (92.3 kg)   Height: 5' 10\" (1.778 m)     Body mass index is 29.18 kg/m². Wt Readings from Last 3 Encounters:   06/10/22 203 lb 6.4 oz (92.3 kg)   03/09/22 211 lb (95.7 kg)   08/27/20 205 lb 6.4 oz (93.2 kg)      BP Readings from Last 3 Encounters:   06/10/22 118/60   03/09/22 128/70   08/27/20 128/76        Physical Examination:    · CONSTITUTIONAL: Well developed, well nourished  · EYES: PERRLA.  No xanthelasma, sclera non icteric  · EARS,NOSE,MOUTH,THROAT:  Mucous membranes moist, normal hearing  · NECK: Supple, JVP normal, thyroid not enlarged. Carotids 2+ without bruits  · RESPIRATORY: Normal effort, no rales or rhonchi  · CARDIOVASCULAR: Normal PMI, regular rate and rhythm, no murmurs, rub or gallop. No edema. Radial pulses present and equal  · CHEST: No scar or masses  · ABDOMEN: Normal bowel sounds. No masses or tenderness. No bruit  · MUSCULOSKELETAL: No clubbing or cyanosis. Moves all extremities well. Normal gait  · SKIN:  Warm and dry. No rashes  · NEUROLOGIC: Cranial nerves intact. Alert and oriented  · PSYCHIATRIC: Calm affect. Appears to have normal judgement and insight        Assessment/Plan  1. Coronary artery disease involving native heart without angina pectoris, unspecified vessel or lesion type   [facility] 3/19/19: LVEDP - 8. LVgram - normal WM and EF 55%  Cors: LM - nl, LAD - prox ectasia ff by a patent stent. irreg throughout with a 50-60% in the apical portion of the vessel, LCX - 4 OMs and minor irreg, RCA - prox 50% with irreg up to 30-40% in the mid portion. s/p PCI of LAD 2008 for NSTEMI    2. Primary hypertension - not currently on any medication    3. Hyperlipidemia, unspecified hyperlipidemia type - well controlled on labs 3/11/22: ; TRIG 193; HDL 35; LDL 73, lipitor 40mg daily   4. PAF (paroxysmal atrial fibrillation) ([facility]) - sinus rhythm, he states he can tell when he is in atrial fibrillation  Has undergone ablation with EP     5. Tobacco use - smoking cessation discussed, he does not show interest in quitting    PLAN:  He appears stable. No med changes. Encouraged to get regular exercise. He does not show interest in quitting smoking. FU 1 year. Scribe's attestation: This note was scribed in the presence of Dr Rudine Nyhan MD by Laney Rios RN. The scribe's documentation has been prepared under my direction and personally reviewed by me in its entirety.  I confirm

## 2022-06-10 ENCOUNTER — OFFICE VISIT (OUTPATIENT)
Dept: CARDIOLOGY CLINIC | Age: 77
End: 2022-06-10
Payer: MEDICARE

## 2022-06-10 VITALS
HEART RATE: 72 BPM | DIASTOLIC BLOOD PRESSURE: 60 MMHG | WEIGHT: 203.4 LBS | BODY MASS INDEX: 29.12 KG/M2 | HEIGHT: 70 IN | SYSTOLIC BLOOD PRESSURE: 118 MMHG | OXYGEN SATURATION: 98 %

## 2022-06-10 DIAGNOSIS — I25.10 CORONARY ARTERY DISEASE INVOLVING NATIVE HEART WITHOUT ANGINA PECTORIS, UNSPECIFIED VESSEL OR LESION TYPE: Primary | ICD-10-CM

## 2022-06-10 DIAGNOSIS — I10 PRIMARY HYPERTENSION: ICD-10-CM

## 2022-06-10 DIAGNOSIS — E78.5 HYPERLIPIDEMIA, UNSPECIFIED HYPERLIPIDEMIA TYPE: ICD-10-CM

## 2022-06-10 DIAGNOSIS — I48.0 PAF (PAROXYSMAL ATRIAL FIBRILLATION) (HCC): ICD-10-CM

## 2022-06-10 PROCEDURE — 99214 OFFICE O/P EST MOD 30 MIN: CPT | Performed by: INTERNAL MEDICINE

## 2022-06-10 PROCEDURE — 1123F ACP DISCUSS/DSCN MKR DOCD: CPT | Performed by: INTERNAL MEDICINE

## 2023-03-23 DIAGNOSIS — E78.00 PURE HYPERCHOLESTEROLEMIA: ICD-10-CM

## 2023-03-23 RX ORDER — ATORVASTATIN CALCIUM 40 MG/1
TABLET, FILM COATED ORAL
Qty: 90 TABLET | Refills: 3 | Status: SHIPPED | OUTPATIENT
Start: 2023-03-23

## 2023-03-23 NOTE — TELEPHONE ENCOUNTER
Last OV:03/09/2022   Ministerio Croft  Last Labs:lipid-03/11/2022  Cbc-03/11/2022  Next OV: none  Last Refill: Atorvastatin 03/14/2022  Rivaroxaban 03/14/2022  Jocelyn Hill

## 2023-06-20 ENCOUNTER — OFFICE VISIT (OUTPATIENT)
Dept: CARDIOLOGY CLINIC | Age: 78
End: 2023-06-20
Payer: MEDICARE

## 2023-06-20 VITALS
BODY MASS INDEX: 28.49 KG/M2 | DIASTOLIC BLOOD PRESSURE: 54 MMHG | SYSTOLIC BLOOD PRESSURE: 122 MMHG | OXYGEN SATURATION: 97 % | HEART RATE: 82 BPM | HEIGHT: 70 IN | WEIGHT: 199 LBS

## 2023-06-20 DIAGNOSIS — I48.0 PAF (PAROXYSMAL ATRIAL FIBRILLATION) (HCC): ICD-10-CM

## 2023-06-20 DIAGNOSIS — E78.00 PURE HYPERCHOLESTEROLEMIA: ICD-10-CM

## 2023-06-20 DIAGNOSIS — I25.10 CORONARY ARTERY DISEASE INVOLVING NATIVE HEART WITHOUT ANGINA PECTORIS, UNSPECIFIED VESSEL OR LESION TYPE: Primary | ICD-10-CM

## 2023-06-20 DIAGNOSIS — I10 PRIMARY HYPERTENSION: ICD-10-CM

## 2023-06-20 PROCEDURE — 1123F ACP DISCUSS/DSCN MKR DOCD: CPT | Performed by: INTERNAL MEDICINE

## 2023-06-20 PROCEDURE — 3074F SYST BP LT 130 MM HG: CPT | Performed by: INTERNAL MEDICINE

## 2023-06-20 PROCEDURE — 99214 OFFICE O/P EST MOD 30 MIN: CPT | Performed by: INTERNAL MEDICINE

## 2023-06-20 PROCEDURE — 3078F DIAST BP <80 MM HG: CPT | Performed by: INTERNAL MEDICINE

## 2024-03-15 DIAGNOSIS — E78.00 PURE HYPERCHOLESTEROLEMIA: ICD-10-CM

## 2024-03-15 NOTE — TELEPHONE ENCOUNTER
Refill request XARELTO 20 MG TABLET   ATORVASTATIN 40 MG TABLET     Last OV:6/20/23 DAH    Last Lab:3/10/22 LIPID    Next Appt:3/26/24 DAH      rivaroxaban (XARELTO) 20 MG TABS tablet [[id number]]    Order Details  Dose, Route, Frequency: As Directed   Dispense Quantity: 90 tablet Refills: 3          Sig: TAKE ONE TABLET BY MOUTH DAILY WITH BREAKFAST, MUST TAKE WITH FOOD         Start Date: 03/23/23 End Date: --   Written Date: 03/23/23 Expiration Date: 03/22/24   Original Order: rivaroxaban (XARELTO) 20 MG TABS tablet [[id number]]   Providers    Authorizing Provider: Bert Mckeon APRN - CNP  NPI: [id number]   Ordering User: Bert Mckeon APRN - CNP      atorvastatin (LIPITOR) 40 MG tablet [[id number]]    Order Details  Dose, Route, Frequency: As Directed   Dispense Quantity: 90 tablet Refills: 3          Sig: TAKE ONE TABLET BY MOUTH EVERY NIGHT AT BEDTIME         Start Date: 03/23/23 End Date: --   Written Date: 03/23/23 Expiration Date: 03/22/24       Associated Diagnoses: Pure hypercholesterolemia [E78.00]   Original Order: atorvastatin (LIPITOR) 40 MG tablet [[id number]]   Providers      Authorizing Provider: Bert Mckeon APRN - CNP  NPI: [id number]   Ordering User: Bert Mckeon APRN - CNP

## 2024-03-20 RX ORDER — ATORVASTATIN CALCIUM 40 MG/1
TABLET, FILM COATED ORAL
Qty: 90 TABLET | Refills: 3 | Status: SHIPPED | OUTPATIENT
Start: 2024-03-20

## 2024-03-20 NOTE — PROGRESS NOTES
behavior.  Psychiatric: No anxiety or depression  Endocrine: No malaise or fever  Hematologic/Lymphatic: No abnormal bruising or bleeding, blood clots or swollen lymph nodes.  Allergic/Immunologic: No nasal congestion or hives.    Physical Examination:    Vitals:    03/26/24 1314   BP: 114/60   Site: Left Upper Arm   Position: Sitting   Cuff Size: Medium Adult   Pulse: 70   SpO2: 98%   Weight: 93.2 kg (205 lb 6.4 oz)   Height: 1.778 m (5' 10\")         Body mass index is 29.47 kg/m².     Wt Readings from Last 3 Encounters:   03/26/24 93.2 kg (205 lb 6.4 oz)   06/20/23 90.3 kg (199 lb)   06/10/22 92.3 kg (203 lb 6.4 oz)      BP Readings from Last 3 Encounters:   03/26/24 114/60   06/20/23 (!) 122/54   06/10/22 118/60        Physical Examination:    CONSTITUTIONAL: Well developed, well nourished  EYES: PERRLA. No xanthelasma, sclera non icteric  EARS,NOSE,MOUTH,THROAT:  Mucous membranes moist, normal hearing  NECK: Supple, JVP normal, thyroid not enlarged. Carotids 2+ without bruits  RESPIRATORY: Normal effort, no rales or rhonchi  CARDIOVASCULAR: Normal PMI, regular rate and rhythm, no murmurs, rub or gallop. No edema. Radial pulses present and equal  CHEST: No scar or masses  ABDOMEN: Normal bowel sounds. No masses or tenderness. No bruit  MUSCULOSKELETAL: No clubbing or cyanosis. Moves all extremities well. Normal gait  SKIN:  Warm and dry. No rashes  NEUROLOGIC: Cranial nerves intact. Alert and oriented  PSYCHIATRIC: Calm affect. Appears to have normal judgement and insight        Assessment/Plan  1. Coronary artery disease involving native heart without angina pectoris, unspecified vessel or lesion type   [facility] 3/19/19: LVEDP - 8. LVgram - normal WM and EF 55%  Cors: LM - nl, LAD - prox ectasia ff by a patent stent. irreg throughout with a 50-60% in the apical portion of the vessel, LCX - 4 OMs and minor irreg, RCA - prox 50% with irreg up to 30-40% in the mid portion.   s/p PCI of LAD 2008 for NSTEMI    2. Primary

## 2024-03-26 ENCOUNTER — OFFICE VISIT (OUTPATIENT)
Dept: CARDIOLOGY CLINIC | Age: 79
End: 2024-03-26
Payer: MEDICARE

## 2024-03-26 VITALS
BODY MASS INDEX: 29.41 KG/M2 | HEIGHT: 70 IN | DIASTOLIC BLOOD PRESSURE: 60 MMHG | HEART RATE: 70 BPM | WEIGHT: 205.4 LBS | SYSTOLIC BLOOD PRESSURE: 114 MMHG | OXYGEN SATURATION: 98 %

## 2024-03-26 DIAGNOSIS — I48.0 PAF (PAROXYSMAL ATRIAL FIBRILLATION) (HCC): ICD-10-CM

## 2024-03-26 DIAGNOSIS — I10 PRIMARY HYPERTENSION: ICD-10-CM

## 2024-03-26 DIAGNOSIS — E78.00 PURE HYPERCHOLESTEROLEMIA: ICD-10-CM

## 2024-03-26 DIAGNOSIS — I25.10 CORONARY ARTERY DISEASE INVOLVING NATIVE HEART WITHOUT ANGINA PECTORIS, UNSPECIFIED VESSEL OR LESION TYPE: Primary | ICD-10-CM

## 2024-03-26 PROCEDURE — 3074F SYST BP LT 130 MM HG: CPT | Performed by: INTERNAL MEDICINE

## 2024-03-26 PROCEDURE — 99214 OFFICE O/P EST MOD 30 MIN: CPT | Performed by: INTERNAL MEDICINE

## 2024-03-26 PROCEDURE — 3078F DIAST BP <80 MM HG: CPT | Performed by: INTERNAL MEDICINE

## 2024-03-26 PROCEDURE — 1123F ACP DISCUSS/DSCN MKR DOCD: CPT | Performed by: INTERNAL MEDICINE

## 2024-12-09 LAB
ALBUMIN: 4.2 G/DL (ref 3.5–5.2)
ALP BLD-CCNC: 81 IU/L (ref 40–129)
ALT SERPL-CCNC: 21 IU/L (ref 10–40)
ANION GAP SERPL CALCULATED.3IONS-SCNC: 8 MMOL/L (ref 4–16)
AST SERPL-CCNC: 19 IU/L (ref 10–40)
BASOPHILS ABSOLUTE: 0.1 THOU/MCL (ref 0–0.2)
BASOPHILS ABSOLUTE: 1 %
BILIRUB SERPL-MCNC: 0.6 MG/DL (ref 0–1.2)
BUN BLDV-MCNC: 25 MG/DL (ref 8–26)
CALCIUM SERPL-MCNC: 9.8 MG/DL (ref 8.5–10.4)
CHLORIDE BLD-SCNC: 106 MMOL/L (ref 98–111)
CHOLESTEROL, TOTAL: 133 MG/DL
CO2: 25 MMOL/L (ref 21–31)
CREAT SERPL-MCNC: 1.27 MG/DL (ref 0.7–1.3)
EGFR (CKD-EPI): 58 ML/MIN/1.73 M2
EOSINOPHILS ABSOLUTE: 0.3 THOU/MCL (ref 0.03–0.45)
EOSINOPHILS RELATIVE PERCENT: 3 %
GLUCOSE BLD-MCNC: 100 MG/DL (ref 70–99)
HCT VFR BLD CALC: 43.1 % (ref 40–50)
HDLC SERPL-MCNC: 33 MG/DL
HEMOGLOBIN: 14.4 G/DL (ref 13.5–16.5)
LDL CHOLESTEROL: 73 MG/DL
LYMPHOCYTES ABSOLUTE: 1.4 THOU/MCL (ref 1–4)
LYMPHOCYTES RELATIVE PERCENT: 18 %
MCH RBC QN AUTO: 30.2 PG (ref 27–33)
MCHC RBC AUTO-ENTMCNC: 33.5 G/DL (ref 32–36)
MCV RBC AUTO: 90.2 FL (ref 82–97)
MONOCYTES ABSOLUTE: 0.9 THOU/MCL (ref 0.2–0.9)
MONOCYTES RELATIVE PERCENT: 11 %
NEUTROPHILS ABSOLUTE: 5.3 THOU/MCL (ref 1.8–7.7)
NONHDLC SERPL-MCNC: 100 MG/DL
PDW BLD-RTO: 13.7 %
PLATELET # BLD: 210 THOU/MCL (ref 140–375)
PMV BLD AUTO: 8.2 FL (ref 7.4–11.5)
POTASSIUM SERPL-SCNC: 4.5 MMOL/L (ref 3.6–5.1)
RBC # BLD: 4.78 MIL/MCL (ref 4.4–5.8)
SEG NEUTROPHILS: 67 %
SODIUM BLD-SCNC: 139 MMOL/L (ref 135–145)
TOTAL PROTEIN: 7.3 G/DL (ref 6.4–8.3)
TRIGL SERPL-MCNC: 136 MG/DL
WBC # BLD: 8 THOU/MCL (ref 3.6–10.5)

## 2024-12-12 NOTE — PROGRESS NOTES
PMI, regular rate and rhythm, no murmurs, rub or gallop. No edema. Radial pulses present and equal  CHEST: No scar or masses  ABDOMEN: Normal bowel sounds. No masses or tenderness. No bruit  MUSCULOSKELETAL: No clubbing or cyanosis. Moves all extremities well. Normal gait  SKIN:  Warm and dry. No rashes  NEUROLOGIC: Cranial nerves intact. Alert and oriented  PSYCHIATRIC: Calm affect. Appears to have normal judgement and insight        Assessment/Plan  1. Coronary artery disease involving native heart without angina pectoris, unspecified vessel or lesion type   [facility] 3/19/19: LVEDP - 8. LVgram - normal WM and EF 55%  Cors: LM - nl, LAD - prox ectasia ff by a patent stent. irreg throughout with a 50-60% in the apical portion of the vessel, LCX - 4 OMs and minor irreg, RCA - prox 50% with irreg up to 30-40% in the mid portion.   s/p PCI of LAD 2008 for NSTEMI    2. Primary hypertension - not currently on any medication, b/p is well  controlled    3. Hyperlipidemia, unspecified hyperlipidemia type - well controlled on labs 12/9/24: ; TRIG 136; HDL 33; LDL 73, lipitor 40mg daily   4. PAF (paroxysmal atrial fibrillation) ([facility]) - sinus rhythm, he states he can tell when he is in atrial fibrillation  Has undergone ablation with EP, is continuing Xarelto 20mg daily   5.      Tobacco use - smoking cessation discussed, he does not show interest in quitting      PLAN:  Advised to monitor his HR periodically and when he has symptoms.  No medication changes. Strongly encouraged to quit smoking. He has been advised to stay hydrated. FU yearly.     Scribe's attestation: This note was scribed in the presence of Dr Uma WARD by Jacque Archer RN.The scribe's documentation has been prepared under my direction and personally reviewed by me in its entirety. I confirm that the note above accurately reflects all work, treatment, procedures, and medical decision making performed by me.           Thank you for allowing to me to

## 2024-12-19 ENCOUNTER — OFFICE VISIT (OUTPATIENT)
Dept: CARDIOLOGY CLINIC | Age: 79
End: 2024-12-19
Payer: MEDICARE

## 2024-12-19 VITALS
HEART RATE: 82 BPM | HEIGHT: 70 IN | BODY MASS INDEX: 28.92 KG/M2 | SYSTOLIC BLOOD PRESSURE: 132 MMHG | DIASTOLIC BLOOD PRESSURE: 60 MMHG | WEIGHT: 202 LBS | OXYGEN SATURATION: 95 %

## 2024-12-19 DIAGNOSIS — I10 PRIMARY HYPERTENSION: ICD-10-CM

## 2024-12-19 DIAGNOSIS — I25.10 CORONARY ARTERY DISEASE INVOLVING NATIVE HEART WITHOUT ANGINA PECTORIS, UNSPECIFIED VESSEL OR LESION TYPE: ICD-10-CM

## 2024-12-19 DIAGNOSIS — I48.19 ATRIAL FIBRILLATION, PERSISTENT (HCC): Primary | ICD-10-CM

## 2024-12-19 DIAGNOSIS — E78.00 PURE HYPERCHOLESTEROLEMIA: ICD-10-CM

## 2024-12-19 PROCEDURE — 99214 OFFICE O/P EST MOD 30 MIN: CPT | Performed by: INTERNAL MEDICINE

## 2024-12-19 PROCEDURE — 1159F MED LIST DOCD IN RCRD: CPT | Performed by: INTERNAL MEDICINE

## 2024-12-19 PROCEDURE — 93000 ELECTROCARDIOGRAM COMPLETE: CPT | Performed by: INTERNAL MEDICINE

## 2024-12-19 PROCEDURE — 3078F DIAST BP <80 MM HG: CPT | Performed by: INTERNAL MEDICINE

## 2024-12-19 PROCEDURE — 1123F ACP DISCUSS/DSCN MKR DOCD: CPT | Performed by: INTERNAL MEDICINE

## 2024-12-19 PROCEDURE — 3075F SYST BP GE 130 - 139MM HG: CPT | Performed by: INTERNAL MEDICINE

## 2025-03-18 DIAGNOSIS — E78.00 PURE HYPERCHOLESTEROLEMIA: ICD-10-CM

## 2025-03-18 RX ORDER — ATORVASTATIN CALCIUM 40 MG/1
40 TABLET, FILM COATED ORAL NIGHTLY
Qty: 90 TABLET | Refills: 3 | Status: SHIPPED | OUTPATIENT
Start: 2025-03-18

## 2025-03-18 NOTE — TELEPHONE ENCOUNTER
Refill requested from [facility] via fax    12/19/2024 OV with DAH    12/9/2025 next OV     12/9/2024 lipids and CBC done